# Patient Record
Sex: MALE | Race: WHITE | NOT HISPANIC OR LATINO | Employment: OTHER | URBAN - METROPOLITAN AREA
[De-identification: names, ages, dates, MRNs, and addresses within clinical notes are randomized per-mention and may not be internally consistent; named-entity substitution may affect disease eponyms.]

---

## 2021-05-04 ENCOUNTER — TELEPHONE (OUTPATIENT)
Dept: SLEEP MEDICINE | Facility: MEDICAL CENTER | Age: 66
End: 2021-05-04

## 2021-05-04 NOTE — TELEPHONE ENCOUNTER
EMAIL RECEIVED FROM ANGIE.    PT INTERESTED IN PURSUING [facility] [employer].    CALLED PATIENT AND ADVISED HIM TO HAVE A REFERRAL SENT TO OUR CLINIC TO GET ESTABLISHED.

## 2021-07-28 ENCOUNTER — SLEEP CENTER VISIT (OUTPATIENT)
Dept: SLEEP MEDICINE | Facility: MEDICAL CENTER | Age: 66
End: 2021-07-28
Payer: MEDICARE

## 2021-07-28 VITALS
SYSTOLIC BLOOD PRESSURE: 124 MMHG | OXYGEN SATURATION: 98 % | BODY MASS INDEX: 34.46 KG/M2 | HEIGHT: 73 IN | RESPIRATION RATE: 16 BRPM | DIASTOLIC BLOOD PRESSURE: 82 MMHG | WEIGHT: 260 LBS | HEART RATE: 75 BPM

## 2021-07-28 DIAGNOSIS — G47.33 OBSTRUCTIVE SLEEP APNEA (ADULT) (PEDIATRIC): ICD-10-CM

## 2021-07-28 PROCEDURE — 99203 OFFICE O/P NEW LOW 30 MIN: CPT | Performed by: FAMILY MEDICINE

## 2021-07-28 NOTE — PROGRESS NOTES
[facility]  Consult Note     Date: 7/28/2021 / Time: 11:03 AM    Patient ID:   Name:             Vince Chavez     YOB: 1955  Age:                 65 y.o.  male   MRN:               [id number]      Thank you for requesting a sleep medicine consultation on Vince Chavez at the sleep center. He presents today with the chief complaints of WESTON and to establish as a new pt. Pt was diagnosed with WESTON on 8/15/2018.  Based on the chart review he had severe sleep apnea with AHI of 80.5 times per hour.  He had a repeat PSG on 11/14/2018 which also showed severe sleep apnea with AHI of 29.1/h.  His latest split-night study was on 11/25/2019.  The AHI was 91.8/h.  The best tolerated pressure was BiPAP 13/9 centimeters.  At this pressure the AHI improved to 8.4/h with 26% of the events being central apneas.  He had residual nocturnal hypoxia therefore O2 bleeding was tried as well.  Based on the chart review he is supposed to be on BiPAP 13/9 with O2 at 2 L/min.  he initial presenting symptoms were snoring, witnessed apneas and non restful sleep. As per pt he continues to have afternoon sleepiness.  As per patient he does not feel any benefit from the therapy and has visual snoring as well as witnessed apnea. He is referred by Dr. Valdez for evaluation and treatment of sleep disorder breathing.     HISTORY OF PRESENT ILLNESS:       At night,  Vince Chavez goes to bed around 9-10 pm on weekdays and  on the weekends. He gets out of bed at 7-8 am on weekdays and on the weekends.  He  averages about 7 hrs of sleep on a good night. He falls asleep within 20 minutes. He wakes up about 1-2 times during the night due to bathroom use and on average It takes him few mins to 15 to fall back asleep.      He is not aware of snoring/breathing pauses/gasping or choking in sleep since he has been on the CPAP. However he occasionally breakthrough snoring and gasping. He  denies any symptoms of restless legs  "syndrome such as an \"urge to move\"  He  legs in the evening or bedtime. He  denies any symptoms of narcolepsy such as sleep paralysis or cataplexy, or any symptoms to suggest parasomnias such as sleep walking or acting out of dreams. He  has not used any medications for the sleep problem.Overall, he does finds his sleep refreshing. In terms of excessive daytime sleepiness,  he complains of sleepiness while reading, watching TV and occasionally while driving.He does take regular naps. The naps are usually 45 min long.He drinks about 2-16 oz caffeinated beverages per day.    We received his 30-day compliance download after his visit.  He is currently on auto BiPAP he PAP min 8 cm IPAP max 18 pressure support 0/4.  His current AHI is 51.1/h and mostly being hypopneas.    REVIEW OF SYSTEMS:       Constitutional: Denies fevers, Denies weight changes  Eyes: Denies changes in vision, no eye pain  Ears/Nose/Throat/Mouth: Denies nasal congestion or sore throat   Cardiovascular: Denies chest pain or palpitations   Respiratory: Denies shortness of breath , Denies cough  Gastrointestinal/Hepatic: Denies abdominal pain, nausea,   Musculoskeletal/Rheum: Denies  joint pain and swelling   Skin/Breast: Denies rash  Neurological: Denies headache, confusion, memory loss or focal weakness/parasthesias  Psychiatric: denies mood disorder     Comprehensive review of systems form is reviewed with the patient and is attached in the EMR.     PMH:  has a past medical history of Abnormal EKG (9/11/07), BPH (benign prostatic hyperplasia), Chickenpox, Colon polyp (2007, 2014), ED (erectile dysfunction), Elevated blood pressure, [place] measles, endoscopy (11/30/07), Obesity, and Sleep apnea.  MEDS:   Current Outpatient Medications:   •  lisinopril-hydrochlorothiazide (PRINZIDE) 10-12.5 MG per tablet, Take 1 tablet by mouth every day., Disp: 90 tablet, Rfl: 2  •  tamsulosin (FLOMAX) 0.4 MG capsule, Take 1 capsule by mouth 1/2 hour after " "breakfast., Disp: 90 capsule, Rfl: 2  •  sildenafil (REVATIO) 20 MG tablet, TAKE 3 TABLETS (60 MG) ORALLY DAILY AS NEEDED 1 HOUR BEFORE SEX (MAX 3/DAY), Disp: , Rfl: 6  ALLERGIES: No Known Allergies  SURGHX:   Past Surgical History:   Procedure Laterality Date   • TONSILLECTOMY       SOCHX:  reports that he has quit smoking. He has a 6.00 pack-year smoking history. He has never used smokeless tobacco. He reports current alcohol use of about 4.8 oz of alcohol per week. He reports that he does not use drugs.FH: No family history on file.    Physical Exam:  Vitals/ General Appearance:   Weight/BMI: Body mass index is 34.3 kg/m².  /82 (BP Location: Right arm, Patient Position: Sitting, BP Cuff Size: Adult)   Pulse 75   Resp 16   Ht 1.854 m (6' 1\")   Wt 118 kg (260 lb)   SpO2 98%   Vitals:    07/28/21 1102   BP: 124/82   BP Location: Right arm   Patient Position: Sitting   BP Cuff Size: Adult   Pulse: 75   Resp: 16   SpO2: 98%   Weight: 118 kg (260 lb)   Height: 1.854 m (6' 1\")           Constitutional: Alert, no distress, well-groomed.  Skin: No rashes in visible areas.  Eye: Round. Conjunctiva clear, lids normal. No icterus.   ENMT: Lips pink without lesions, good dentition, moist mucous membranes. Phonation normal.  Neck: No masses, no thyromegaly. Moves freely without pain.  CV: Pulse as reported by patient  Respiratory: Unlabored respiratory effort, no cough or audible wheeze  Psych: Alert and oriented x3, normal affect and mood.   INVESTIGATIONS:       ASSESSMENT AND PLAN     1. Sleep Apnea      The pathophysiology of sleep anea and the increased risk of cardiovascular morbidity from untreated sleep apnea is discussed in detail with the patient.He is urged to avoid supine sleep, weight gain and alcoholic beverages since all of these can worsen sleep apnea. He is cautioned against drowsy driving. If He feels sleepy while driving, He must pull over for a break/nap, rather than persist on the road, in the " interest of He own safety and that of others on the road.   Plan   - Continue BiPAP at the current pressure.  Commended to bring in his current PAP machine so we can do the compliance download.  If the AHI is elevated we will try to switch him to auto BiPAP.   -Previous sleep studies were reviewed and discussed with the pt   - compliance was reinforced     2. Regarding treatment of other past medical problems and general health maintenance,  He is urged to follow up with PCP.    ADD: I tried calling the patient to discuss the change in pressure however voicemail was left.  If patient agrees will change the BiPAP setting to auto BiPAP EPAP min 10 IPAP max 20 pressure support 4/4.

## 2021-07-28 NOTE — PATIENT INSTRUCTIONS
"  CPAP and BPAP Information  CPAP and BPAP are methods of helping a person breathe with the use of air pressure. CPAP stands for \"continuous positive airway pressure.\" BPAP stands for \"bi-level positive airway pressure.\" In both methods, air is blown through your nose or mouth and into your air passages to help you breathe well.  CPAP and BPAP use different amounts of pressure to blow air. With CPAP, the amount of pressure stays the same while you breathe in and out. With BPAP, the amount of pressure is increased when you breathe in (inhale) so that you can take larger breaths. Your health care provider will recommend whether CPAP or BPAP would be more helpful for you.  Why are CPAP and BPAP treatments used?  CPAP or BPAP can be helpful if you have:  · Sleep apnea.  · Chronic obstructive pulmonary disease (COPD).  · Heart failure.  · Medical conditions that weaken the muscles of the chest including muscular dystrophy, or neurological diseases such as amyotrophic lateral sclerosis (ALS).  · Other problems that cause breathing to be weak, abnormal, or difficult.  CPAP is most commonly used for obstructive sleep apnea (WESTON) to keep the airways from collapsing when the muscles relax during sleep.  How is CPAP or BPAP administered?  Both CPAP and BPAP are provided by a small machine with a flexible plastic tube that attaches to a plastic mask. You wear the mask. Air is blown through the mask into your nose or mouth. The amount of pressure that is used to blow the air can be adjusted on the machine. Your health care provider will determine the pressure setting that should be used based on your individual needs.  When should CPAP or BPAP be used?  In most cases, the mask only needs to be worn during sleep. Generally, the mask needs to be worn throughout the night and during any daytime naps. People with certain medical conditions may also need to wear the mask at other times when they are awake. Follow instructions from " your health care provider about when to use the machine.  What are some tips for using the mask?    · Because the mask needs to be snug, some people feel trapped or closed-in (claustrophobic) when first using the mask. If you feel this way, you may need to get used to the mask. One way to do this is by holding the mask loosely over your nose or mouth and then gradually applying the mask more snugly. You can also gradually increase the amount of time that you use the mask.  · Masks are available in various types and sizes. Some fit over your mouth and nose while others fit over just your nose. If your mask does not fit well, talk with your health care provider about getting a different one.  · If you are using a mask that fits over your nose and you tend to breathe through your mouth, a chin strap may be applied to help keep your mouth closed.  · The CPAP and BPAP machines have alarms that may sound if the mask comes off or develops a leak.  · If you have trouble with the mask, it is very important that you talk with your health care provider about finding a way to make the mask easier to tolerate. Do not stop using the mask. Stopping the use of the mask could have a negative impact on your health.  What are some tips for using the machine?  · Place your CPAP or BPAP machine on a secure table or stand near an electrical outlet.  · Know where the on/off switch is located on the machine.  · Follow instructions from your health care provider about how to set the pressure on your machine and when you should use it.  · Do not eat or drink while the CPAP or BPAP machine is on. Food or fluids could get pushed into your lungs by the pressure of the CPAP or BPAP.  · Do not smoke. Tobacco smoke residue can damage the machine.  · For home use, CPAP and BPAP machines can be rented or purchased through home health care companies. Many different brands of machines are available. Renting a machine before purchasing may help you  find out which particular machine works well for you.  · Keep the CPAP or BPAP machine and attachments clean. Ask your health care provider for specific instructions.  Get help right away if:  · You have redness or open areas around your nose or mouth where the mask fits.  · You have trouble using the CPAP or BPAP machine.  · You cannot tolerate wearing the CPAP or BPAP mask.  · You have pain, discomfort, and bloating in your abdomen.  Summary  · CPAP and BPAP are methods of helping a person breathe with the use of air pressure.  · Both CPAP and BPAP are provided by a small machine with a flexible plastic tube that attaches to a plastic mask.  · If you have trouble with the mask, it is very important that you talk with your health care provider about finding a way to make the mask easier to tolerate.  This information is not intended to replace advice given to you by your health care provider. Make sure you discuss any questions you have with your health care provider.  Document Released: 09/15/2005 Document Revised: 04/08/2020 Document Reviewed: 11/06/2017  Elsevier Patient Education © 2020 [employer]

## 2021-09-03 ENCOUNTER — SLEEP CENTER VISIT (OUTPATIENT)
Dept: SLEEP MEDICINE | Facility: MEDICAL CENTER | Age: 66
End: 2021-09-03
Payer: MEDICARE

## 2021-09-03 VITALS
HEART RATE: 72 BPM | RESPIRATION RATE: 16 BRPM | HEIGHT: 73 IN | WEIGHT: 264 LBS | BODY MASS INDEX: 34.99 KG/M2 | SYSTOLIC BLOOD PRESSURE: 134 MMHG | OXYGEN SATURATION: 94 % | DIASTOLIC BLOOD PRESSURE: 88 MMHG

## 2021-09-03 DIAGNOSIS — G47.34 SLEEP RELATED HYPOXIA: ICD-10-CM

## 2021-09-03 DIAGNOSIS — G47.33 OSA (OBSTRUCTIVE SLEEP APNEA): ICD-10-CM

## 2021-09-03 PROCEDURE — 99213 OFFICE O/P EST LOW 20 MIN: CPT | Performed by: FAMILY MEDICINE

## 2021-09-03 NOTE — PROGRESS NOTES
[facility] Follow Up Note     Date: 9/3/2021 / Time: 11:05 AM    Patient ID:   Name:             Vince Chavez     YOB: 1955  Age:                 65 y.o.  male   MRN:               [id number]      Thank you for requesting a sleep medicine consultation on Vince Chavez at the sleep center. He presents today with the chief complaints of WESTON follow up.     HISTORY OF PRESENT ILLNESS:       Pt is currently on auto BiPAP EPAP min 10 IPAP max 20 pressure support 4/4. He goes to sleep 9-10 pm on weekdays and  on the weekends. He gets out of bed at 7-8 am on weekdays and on the weekends.  He  averages about 7 hrs of sleep on a good night. He denies any symptoms of RLS, narcolepsy or any symptoms to suggest parasomnias such as nightmares, sleep walking or acting out of dreams.      He is using BiPAP most days of the week. Pt reports 2-4 hrs of average nightly use of BiPAP. Pt denies snoring, gasping,choking.Pt also denies significant mask leak that is interfering with sleep. The 30 day compliance was downloaded which shows inadequate compliance with more that 4 hr usage about 13%. The AHI is has improved to 30.7/hr. The mask leak is abnormal. The symptoms of excessive daytime has continued.     SLEEP HISTORY   was diagnosed with WESTON on 8/15/2018.  Based on the chart review he had severe sleep apnea with AHI of 80.5 times per hour.  He had a repeat PSG on 11/14/2018 which also showed severe sleep apnea with AHI of 29.1/h.  His latest split-night study was on 11/25/2019.  The AHI was 91.8/h.  The best tolerated pressure was BiPAP 13/9 centimeters.  At this pressure the AHI improved to 8.4/h with 26% of the events being central apneas.  He had residual nocturnal hypoxia therefore O2 bleeding was tried as well.  Based on the chart review he is supposed to be on BiPAP 13/9 with O2 at 2 L/min.       REVIEW OF SYSTEMS:       Constitutional: Denies fevers, Denies weight changes  Eyes: Denies  changes in vision, no eye pain  Ears/Nose/Throat/Mouth: Denies nasal congestion or sore throat   Cardiovascular: Denies chest pain or palpitations   Respiratory: Denies shortness of breath , Denies cough  Gastrointestinal/Hepatic: Denies abdominal pain, nausea, vomiting, diarrhea, constipation or GI bleeding   Genitourinary: Deniesdysuria or frequency  Musculoskeletal/Rheum: Denies  joint pain and swelling   Skin/Breast: Denies rash,   Neurological: Denies headache, confusion, memory loss or focal weakness/parasthesias  Psychiatric: denies mood disorder   Sleep: denies snoring     Comprehensive review of systems form is reviewed with the patient and is attached in the EMR.     PMH:  has a past medical history of Abnormal EKG (9/11/07), BPH (benign prostatic hyperplasia), Chickenpox, Colon polyp (2007, 2014), ED (erectile dysfunction), Elevated blood pressure, [place] measles, endoscopy (11/30/07), Obesity, and Sleep apnea.  MEDS:   Current Outpatient Medications:   •  lisinopril-hydrochlorothiazide (PRINZIDE) 10-12.5 MG per tablet, Take 1 tablet by mouth every day., Disp: 90 tablet, Rfl: 2  •  tamsulosin (FLOMAX) 0.4 MG capsule, Take 1 capsule by mouth 1/2 hour after breakfast., Disp: 90 capsule, Rfl: 2  •  sildenafil (REVATIO) 20 MG tablet, TAKE 3 TABLETS (60 MG) ORALLY DAILY AS NEEDED 1 HOUR BEFORE SEX (MAX 3/DAY) (Patient not taking: Reported on 9/3/2021), Disp: , Rfl: 6  ALLERGIES: No Known Allergies  SURGHX:   Past Surgical History:   Procedure Laterality Date   • TONSILLECTOMY       SOCHX:  reports that he has quit smoking. He has a 6.00 pack-year smoking history. He has never used smokeless tobacco. He reports current alcohol use of about 4.8 oz of alcohol per week. He reports that he does not use drugs..  FH: No family history on file.      Physical Exam:  Vitals/ General Appearance:   Weight/BMI: Body mass index is 34.83 kg/m².  /88 (BP Location: Right arm, Patient Position: Sitting, BP Cuff Size:  "Adult)   Pulse 72   Resp 16   Ht 1.854 m (6' 1\")   Wt 120 kg (264 lb)   SpO2 94%   Vitals:    09/03/21 1049   BP: 134/88   BP Location: Right arm   Patient Position: Sitting   BP Cuff Size: Adult   Pulse: 72   Resp: 16   SpO2: 94%   Weight: 120 kg (264 lb)   Height: 1.854 m (6' 1\")       Pt. is alert and oriented to time, place and person. Cooperative and in no apparent distress.       Constitutional: Alert, no distress, well-groomed.  Skin: No rashes in visible areas.  Eye: Round. Conjunctiva clear, lids normal. No icterus.   ENMT: Lips pink without lesions, good dentition, moist mucous membranes. Phonation normal.  Neck: No masses, no thyromegaly. Moves freely without pain.  CV: Pulse as reported by patient  Respiratory: Unlabored respiratory effort, no cough or audible wheeze  Psych: Alert and oriented x3, normal affect and mood.     ASSESSMENT AND PLAN     1. Sleep Apnea    The pathophysiology of sleep anea and the increased risk of cardiovascular morbidity from untreated sleep apnea is discussed in detail with the patient.    He is urged to avoid supine sleep, weight gain and alcoholic beverages since all of these can worsen sleep apnea. He is cautioned against drowsy driving. If He feels sleepy while driving, He must pull over for a break/nap, rather than persist on the road, in the interest of He own safety and that of others on the road.   Plan   - Continue auto BiPAP EPAP min 10 IPAP max 20 pressure support 4/4 cm   -  After informed discussion BiPAP/ASV titration is ordered today    - compliance download was reviewed and discussed with the pt   - compliance was reinforced     2. Regarding treatment of other past medical problems and general health maintenance,  He is urged to follow up with PCP.    "

## 2021-10-08 ENCOUNTER — SLEEP STUDY (OUTPATIENT)
Dept: SLEEP MEDICINE | Facility: MEDICAL CENTER | Age: 66
End: 2021-10-08
Attending: FAMILY MEDICINE
Payer: MEDICARE

## 2021-10-08 DIAGNOSIS — G47.33 OSA (OBSTRUCTIVE SLEEP APNEA): ICD-10-CM

## 2021-10-08 DIAGNOSIS — G47.34 SLEEP RELATED HYPOXIA: ICD-10-CM

## 2021-10-08 PROCEDURE — 95811 POLYSOM 6/>YRS CPAP 4/> PARM: CPT | Performed by: FAMILY MEDICINE

## 2021-10-13 NOTE — PROCEDURES
MONTAGE: Standard    STUDY TYPE: Treatment    RECORDING TECHNIQUE:   After the scalp was prepared, gold plated electrodes were applied to the scalp according to the International 10-20 System. EEG (electroencephalogram) was continuously monitored from the O1-M2, O2-M1, C3-M2, C4-M1, F3-M2, and F4-M1. EOGs (electrooculograms) were monitored by electrodes placed at the left and right outer canthi. Chin EMG (electromyogram) was monitored by electrodes placed on the mentalis and sub-mentalis muscles. Nasal and oral airflow were monitored using a triple port thermocouple as well as oronasal pressure transducer. Respiratory effort was measured by inductive plethysmography technology employing abdominal and thoracic belts. Blood oxygen saturation and pulse were monitored by pulse oximetry. Heart rhythm was monitored by surface electrocardiogram. Leg EMG was studied using surface electrodes placed on left and right anterior tibialis. A microphone was used to monitor tracheal sounds and snoring. Body position was monitored and documented by technician observation.     SCORING CRITERIA:   A modification of the AASM manual for scoring of sleep and associated events was used. Obstructive apneas were scored by cessation of airflow for at least 10 seconds with continuing respiratory effort. Central apneas were scored by cessation of airflow for at least 10 seconds with no respiratory effort. Hypopneas were scored by a 30% or more reduction in airflow for at least 10 seconds accompanied by arterial oxygen desaturation of 3% or an arousal. For CMS (Medicare) patients, per AASM rule 1B, hypopneas are scored by 30% with mild reduction in airflow for at least 10 seconds accompanied by arterial saturation decreased at 4%.    Study start time was 10:20:05 PM. Diagnostic recording time was 8h 11.0m with a total sleep time of 7h 33.5m resulting in a sleep efficiency of 92.36%%. Sleep latency from the start of the study was 03 minutes and  the latency from sleep to REM was 54 minutes. In total,100 arousals were scored for an arousal index of 13.2.    Respiratory:  There were a total of 41 apneas consisting of 4 obstructive apneas, 0 mixed apneas, and 37 central apneas. A total of 39 hypopneas were scored. The apnea index was 5.42 per hour and the hypopnea index was 5.16 per hour resulting in an overall AHI of 10.58. AHI during rem was 2.8 and AHI while supine was 10.60.  49% of the events were central in nature.    Oximetry:  There was a mean oxygen saturation of 94.0% with a minimum oxygen saturation of 78.0%. The patient spent 2.0 minutes of TST with SaO2 <88%.    Cardiac:  The highest heart rate seen while awake was 93 BPM while the highest heart rate during sleep was 69 BPM with an average sleeping heart rate of 53 BPM.    Limb Movements:  There were a total of 29 PLMs during sleep, of which 1 were PLMS arousals. This resulted in a PLMS index of 3.8 and a PLMS arousal index of 0.1.    Titration: BiPAP was tried from 13/9 to 26/21cm H2O      CPAP Titration:  The PAP titration was initiated with BiPAP 13/9 cm of water and the pressure which was slowly titrated up in an attempt to eliminate sleep disordered breathing and snoring. The final pressure tested during the study was BiPAP 26/21 cm water.  The lowest best tolerated pressure was BiPAP 22/18 centimeters.  At this pressure the patient was observed in the supine REM sleep stage. The apnea hypopnea index improved to 3.9 per hour and O2 shah[place] 86%. He spent 3.5 % of sleep time below 88% O2 saturation. Snoring was resolved. The patient utilized medium amaraview mask with heated humidification. The PAP was well-tolerated and there were minimal air leaks. No supplemental oxygen was required.    Impression:  1.  Complex sleep apnea    Recommendations:  I recommend BiPAP 22/18 cm with medium Liane view mask. Recommended 30 day compliance download to assess the efficacy of the recommended pressure and  compliance for further outpatient monitoring and management of CPAP therapy. In some cases alternative treatment options may be proven effective in resolving sleep apnea. These options include upper airway surgery, the use of a dental orthotic, weight loss orpositional therapy. Clinical correlation is required. In general patients with sleep apnea are advised to avoid alcohol, sedatives and not to operate a motor vehicle while drowsy.  Untreated sleep apnea increases the risk for cardiovascular and neurovascular disease.

## 2021-11-10 ENCOUNTER — OFFICE VISIT (OUTPATIENT)
Dept: SLEEP MEDICINE | Facility: MEDICAL CENTER | Age: 66
End: 2021-11-10
Payer: MEDICARE

## 2021-11-10 VITALS
SYSTOLIC BLOOD PRESSURE: 138 MMHG | RESPIRATION RATE: 18 BRPM | BODY MASS INDEX: 35.66 KG/M2 | HEART RATE: 86 BPM | DIASTOLIC BLOOD PRESSURE: 76 MMHG | OXYGEN SATURATION: 95 % | WEIGHT: 269.1 LBS | HEIGHT: 73 IN

## 2021-11-10 DIAGNOSIS — Z23 NEED FOR IMMUNIZATION AGAINST INFLUENZA: ICD-10-CM

## 2021-11-10 DIAGNOSIS — G47.33 OSA (OBSTRUCTIVE SLEEP APNEA): ICD-10-CM

## 2021-11-10 PROCEDURE — 99214 OFFICE O/P EST MOD 30 MIN: CPT | Mod: 25 | Performed by: NURSE PRACTITIONER

## 2021-11-10 PROCEDURE — 90662 IIV NO PRSV INCREASED AG IM: CPT | Performed by: NURSE PRACTITIONER

## 2021-11-10 PROCEDURE — G0008 ADMIN INFLUENZA VIRUS VAC: HCPCS | Performed by: NURSE PRACTITIONER

## 2021-11-10 NOTE — PROGRESS NOTES
Chief Complaint   Patient presents with   • Follow-Up     SS results       HPI:  Vince Chavez is a 66 y.o. year old male here today for follow-up on complex sleep apnea.  Last seen 9/3/2021 by Dr. Nathan.  He initially presented on 7/28/2021 as a new patient referral.  He was diagnosed with WESTON in 2018.  His previous AHI was 80.5/h. He had a repeat PSG on 11/14/2018 which also showed severe sleep apnea with AHI of 29.1/h.  His latest split-night study was on 11/25/2019.  The AHI was 91.8/h. He was previously on auto BiPAP with pressures ranging from 20 to 10 cm/H2O with pressure support of 4.  Previous visit patient was not tolerating BiPAP use and not using it adequately.  They ordered a titration study as a result.    He had a titration study completed on 10/8/2021.  The PAP titration was initiated with BiPAP 13/9 cm of water and the pressure which was slowly titrated up in an attempt to eliminate sleep disordered breathing and snoring. The final pressure tested during the study was BiPAP 26/21 cm water.  The lowest best tolerated pressure was BiPAP 22/18 centimeters.  At this pressure the patient was observed in the supine REM sleep stage. The apnea hypopnea index improved to 3.9 per hour and O2 shahida 86%. He spent 3.5 % of sleep time below 88% O2 saturation. Snoring was resolved. The patient utilized medium amaraview mask with heated humidification.        ROS: As per HPI and otherwise negative if not stated.    Past Medical History:   Diagnosis Date   • Abnormal EKG 9/11/07    non-spec. st abn.   • BPH (benign prostatic hyperplasia)    • Chickenpox    • Colon polyp 2007, 2014   • ED (erectile dysfunction)    • Elevated blood pressure    • [place] measles    • Hx of endoscopy 11/30/07   • Hypertension    • Obesity    • Sleep apnea     failed cpap       Past Surgical History:   Procedure Laterality Date   • LUMBAR INTERLAMINAR EPIDURAL STEROID INJECTION Bilateral 11/2/2021    Procedure: LUMBAR EPIDURAL STEROID  "INJECTION, LEFT AND RIGHT L5-S1 INTERLAMINAR UNDER FLUOROSCOPY NO SEDATION;  Surgeon: Garcia Abbasi D.O.;  Location: [facility];  Service: Orthopedics   • TONSILLECTOMY         History reviewed. No pertinent family history.    Allergies as of 11/10/2021   • (No Known Allergies)        Vitals:  /76 (BP Location: Left arm, Patient Position: Sitting, BP Cuff Size: Large adult)   Pulse 86   Resp 18   Ht 1.854 m (6' 1\")   Wt 122 kg (269 lb 1.6 oz)   SpO2 95%     Current medications as of today   Current Outpatient Medications   Medication Sig Dispense Refill   • lisinopril-hydrochlorothiazide (PRINZIDE) 10-12.5 MG per tablet Take 1 tablet by mouth every day. 90 tablet 2   • tamsulosin (FLOMAX) 0.4 MG capsule Take 1 capsule by mouth 1/2 hour after breakfast. 90 capsule 2     No current facility-administered medications for this visit.         Physical Exam:   Gen:           Alert and oriented, No apparent distress. Mood and affect appropriate, normal interaction with examiner.  Eyes:          PERRL, EOM intact, sclere white, conjunctive moist.  Ears:          Not examined.   Hearing:     Grossly intact.  Nose:          Normal, no lesions or deformities.  Dentition:    Good dentition.  Oropharynx:   Tongue normal, posterior pharynx without erythema or exudate.  Neck:        Supple, trachea midline, no masses.  Respiratory Effort: No intercostal retractions or use of accessory muscles.   Lung Auscultation:      Clear to auscultation bilaterally; no rales, rhonchi or wheezing.  CV:            Regular rate and rhythm. No murmurs, rubs or gallops.  Abd:           Not examined.   Lymphadenopathy: Not examined.  Gait and Station: Normal.  Digits and Nails: No clubbing, cyanosis, petechiae, or nodes.   Cranial Nerves: II-XII grossly intact.  Skin:        No rashes, lesions or ulcers noted.               Ext:           No cyanosis or edema.      Assessment:  1. Need for immunization against influenza  INFLUENZA " VACCINE, HIGH DOSE (65+ ONLY)   2. WESTON (obstructive sleep apnea)  DME Mask Fitting    DME Other       Immunizations:    Flu: 11/10/2021  Pneumovax 23: 10/2/2020  Prevnar 13: 9/23/2019  COVID-19: 3/18/2021, 4/15/2021    Plan:  Updated settings on BiPAP to BiPAP of 22/18 centimeters/H2O.  Also due to complaints of excessive mask leak, we are ordering a mask fitting through [facility] in [place].  Please follow-up 3 months after pressure setting for compliance recheck.    Please note that this dictation was created using voice recognition software. I have made every reasonable attempt to correct obvious errors, but it is possible there are errors of grammar and possibly content that I did not discover before finalizing the note.

## 2021-11-10 NOTE — PATIENT INSTRUCTIONS
Updated settings on BiPAP to BiPAP of 22/18 centimeters/H2O.  Also due to complaints of excessive mask leak, we are ordering a mask fitting through [facility] in [place].  Please follow-up 3 months after pressure setting for compliance recheck.

## 2022-02-14 ENCOUNTER — OFFICE VISIT (OUTPATIENT)
Dept: SLEEP MEDICINE | Facility: MEDICAL CENTER | Age: 67
End: 2022-02-14
Payer: MEDICARE

## 2022-02-14 VITALS
WEIGHT: 270.9 LBS | HEIGHT: 73 IN | OXYGEN SATURATION: 96 % | RESPIRATION RATE: 17 BRPM | SYSTOLIC BLOOD PRESSURE: 148 MMHG | HEART RATE: 80 BPM | DIASTOLIC BLOOD PRESSURE: 92 MMHG | BODY MASS INDEX: 35.9 KG/M2

## 2022-02-14 DIAGNOSIS — G47.33 OSA (OBSTRUCTIVE SLEEP APNEA): ICD-10-CM

## 2022-02-14 PROCEDURE — 99213 OFFICE O/P EST LOW 20 MIN: CPT | Performed by: NURSE PRACTITIONER

## 2022-02-14 NOTE — PROGRESS NOTES
Chief Complaint   Patient presents with   • Follow-Up     WESTON       HPI:  Vince Chavez is a 66 y.o. year old male here today for follow-up on WESTON.  Last seen by me on  11/10/2021.  Is followed by us for complex sleep apnea. He was diagnosed with WESTON in 2018.  His previous AHI was 80.5/h. He had a repeat PSG on 11/14/2018 which also showed severe sleep apnea with AHI of 29.1/h.  His latest split-night study was on 11/25/2019.  The AHI was 91.8/h. He was previously on auto BiPAP with pressures ranging from 20 to 10 cm/H2O with pressure support of 4.  Previous visit patient was not tolerating BiPAP use and not using it adequately.  They ordered a titration study as a result.     He had a titration study completed on 10/8/2021.  The PAP titration was initiated with BiPAP 13/9 cm of water and the pressure which was slowly titrated up in an attempt to eliminate sleep disordered breathing and snoring. The final pressure tested during the study was BiPAP 26/21 cm water.  The lowest best tolerated pressure was BiPAP 22/18 centimeters.  At this pressure the patient was observed in the supine REM sleep stage. The apnea hypopnea index improved to 3.9 per hour and O2 shahida 86%. He spent 3.5 % of sleep time below 88% O2 saturation. Snoring was resolved. The patient utilized medium amaraview mask with heated humidification.    Based on this titration study, we updated the BiPAP pressures to 22/18 centimeters/H2O.  I also ordered a mask fit through [facility] in [place].  Today is his first follow-up for compliance recheck.    Since last visit, patient has received his replacement BiPAP machine through the recall of [employer].  He has also gotten fitted for a different type of masks which he is finding better fit with no leakage problems except for when the mask is close to its time to be replaced.  He feels that leakages are happening more frequently during this time period.  Overall he feels that his sleep quality is  "improved since adjusting pressures, where he finds he sleeps longer and his sleep quality is better.  He denies any excessive daytime sleepiness, morning headaches, palpitations, concentration or memory problems.    Compliance report was reviewed and does show 100% use with an average time of 6 hours and 24 minutes and a resultant AHI of 16.2.  He apparently is using his old ST card with his new BiPAP, and his BiPAP machine has not been set for proper pressures.  Current machine pressures are auto BiPAP with a max IPAP of 25 cm/H2O minimum EPAP of 4 cm/H2O with a pressure support of 2 to 8 cm/H2O.      ROS: As per HPI and otherwise negative if not stated.    Past Medical History:   Diagnosis Date   • Abnormal EKG 9/11/07    non-spec. st abn.   • BPH (benign prostatic hyperplasia)    • Chickenpox    • Colon polyp 2007, 2014   • ED (erectile dysfunction)    • Elevated blood pressure    • Citizen of [place] measles    • Hx of endoscopy 11/30/07   • Hypertension    • Obesity    • Sleep apnea     failed cpap       Past Surgical History:   Procedure Laterality Date   • LUMBAR INTERLAMINAR EPIDURAL STEROID INJECTION Bilateral 11/2/2021    Procedure: LUMBAR EPIDURAL STEROID INJECTION, LEFT AND RIGHT L5-S1 INTERLAMINAR UNDER FLUOROSCOPY NO SEDATION;  Surgeon: Garcia Abbasi D.O.;  Location: [facility];  Service: Orthopedics   • TONSILLECTOMY         History reviewed. No pertinent family history.    Allergies as of 02/14/2022   • (No Known Allergies)        Vitals:  /92 (BP Location: Left arm, Patient Position: Sitting, BP Cuff Size: Large adult)   Pulse 80   Resp 17   Ht 1.854 m (6' 1\")   Wt 123 kg (270 lb 14.4 oz)   SpO2 96%     Current medications as of today   Current Outpatient Medications   Medication Sig Dispense Refill   • sildenafil (REVATIO) 20 MG tablet TAKE 3 TABLETS (60 MG) BY MOUTH ONCE DAILY AS NEEDED 1 HOUR BEFORE SEX (MAX 3 TABLETS/DAY)     • lisinopril-hydrochlorothiazide (PRINZIDE) 10-12.5 MG per " tablet Take 1 tablet by mouth every day. 90 tablet 2   • tamsulosin (FLOMAX) 0.4 MG capsule Take 1 capsule by mouth 1/2 hour after breakfast. 90 capsule 2     No current facility-administered medications for this visit.         Physical Exam:   Gen:           Alert and oriented, No apparent distress. Mood and affect appropriate, normal interaction with examiner.  Eyes:          PERRL, EOM intact, sclere white, conjunctive moist.  Ears:          Not examined.   Hearing:     Grossly intact.  Nose:          Normal, no lesions or deformities.  Dentition:    Good dentition.  Oropharynx:   Tongue normal, posterior pharynx without erythema or exudate.  Neck:        Supple, trachea midline, no masses.  Respiratory Effort: No intercostal retractions or use of accessory muscles.   Lung Auscultation:      Clear to auscultation bilaterally; no rales, rhonchi or wheezing.  CV:            Regular rate and rhythm. No murmurs, rubs or gallops.  Abd:           Not examined.   Lymphadenopathy: Not examined.  Gait and Station: Normal.  Digits and Nails: No clubbing, cyanosis, petechiae, or nodes.   Cranial Nerves: II-XII grossly intact.  Skin:        No rashes, lesions or ulcers noted.               Ext:           No cyanosis or edema.      Assessment:  1. WESTON (obstructive sleep apnea)           Plan:  1.  Patient will need to bring his replacement BiPAP machine into clinic for pressure adjustment.  He states the soonest he can do this is 2 days from now.  We will adjust pressures then and have him message us in about a month to check compliance.  If all looks okay, we will go to annual visit in July 2022.  Advised patient to reach out with any questions or concerns before next appointment.  In regards to leakage, I recommended a CPAP mask cover.    Please note that this dictation was created using voice recognition software. I have made every reasonable attempt to correct obvious errors, but it is possible there are errors of grammar  and possibly content that I did not discover before finalizing the note.

## 2022-02-14 NOTE — PATIENT INSTRUCTIONS
Patient to bring in machine to verify settings on replacement Cpap (warranty). Compliance reads auto BiPap and SD card reads correct settings of BiPap 22/18 cm/H2O. Follow up in July for annual.  Patient also states his mask wear down and begin to leak before eligible for replacement. For this I recommend Cpap mask liner.

## 2022-02-18 ENCOUNTER — TELEPHONE (OUTPATIENT)
Dept: SLEEP MEDICINE | Facility: MEDICAL CENTER | Age: 67
End: 2022-02-18
Payer: MEDICARE

## 2022-02-18 NOTE — TELEPHONE ENCOUNTER
Pt brought in her replacement machine for it to be adjusted and pressure was change to auto BiPAP with a max IPAP of 25 cm/H2O minimum EPAP of 4 cm/H2O with a pressure support of 2 to 8 cm/H2O.

## 2022-09-09 PROBLEM — I10 ESSENTIAL HYPERTENSION: Status: ACTIVE | Noted: 2022-09-09

## 2022-09-09 PROBLEM — M48.062 SPINAL STENOSIS OF LUMBAR REGION WITH NEUROGENIC CLAUDICATION: Status: ACTIVE | Noted: 2022-09-09

## 2022-09-09 PROBLEM — D48.9 NEOPLASM OF UNCERTAIN BEHAVIOR: Status: ACTIVE | Noted: 2022-09-09

## 2022-09-09 PROBLEM — R73.9 HYPERGLYCEMIA: Status: ACTIVE | Noted: 2022-09-09

## 2022-09-09 PROBLEM — L72.0 EPIDERMAL CYST: Status: ACTIVE | Noted: 2022-09-09

## 2022-09-09 PROBLEM — D23.61 OTHER BENIGN NEOPLASM OF SKIN OF RIGHT UPPER LIMB, INCLUDING SHOULDER: Status: ACTIVE | Noted: 2022-09-09

## 2023-05-01 PROBLEM — M48.062 LUMBAR STENOSIS WITH NEUROGENIC CLAUDICATION: Status: ACTIVE | Noted: 2023-05-01

## 2024-10-08 PROBLEM — N52.9 ERECTILE DYSFUNCTION: Status: ACTIVE | Noted: 2024-10-08

## 2024-10-08 PROBLEM — G89.29 CHRONIC PAIN OF RIGHT KNEE: Status: ACTIVE | Noted: 2024-10-08

## 2024-10-08 PROBLEM — M25.561 CHRONIC PAIN OF RIGHT KNEE: Status: ACTIVE | Noted: 2024-10-08

## 2025-03-16 ENCOUNTER — APPOINTMENT (OUTPATIENT)
Dept: RADIOLOGY | Facility: MEDICAL CENTER | Age: 70
DRG: 418 | End: 2025-03-16
Attending: EMERGENCY MEDICINE
Payer: MEDICARE

## 2025-03-16 ENCOUNTER — HOSPITAL ENCOUNTER (INPATIENT)
Facility: MEDICAL CENTER | Age: 70
LOS: 1 days | DRG: 418 | End: 2025-03-19
Attending: EMERGENCY MEDICINE | Admitting: SURGERY
Payer: MEDICARE

## 2025-03-16 DIAGNOSIS — R78.81 BACTEREMIA: ICD-10-CM

## 2025-03-16 DIAGNOSIS — M25.561 CHRONIC PAIN OF RIGHT KNEE: ICD-10-CM

## 2025-03-16 DIAGNOSIS — G89.18 ACUTE POST-OPERATIVE PAIN: ICD-10-CM

## 2025-03-16 DIAGNOSIS — K81.9 CHOLECYSTITIS: ICD-10-CM

## 2025-03-16 DIAGNOSIS — R07.9 CHEST PAIN, UNSPECIFIED TYPE: ICD-10-CM

## 2025-03-16 DIAGNOSIS — G89.29 CHRONIC PAIN OF RIGHT KNEE: ICD-10-CM

## 2025-03-16 LAB
ALBUMIN SERPL BCP-MCNC: 4 G/DL (ref 3.2–4.9)
ALBUMIN/GLOB SERPL: 1.4 G/DL
ALP SERPL-CCNC: 58 U/L (ref 30–99)
ALT SERPL-CCNC: 94 U/L (ref 2–50)
ANION GAP SERPL CALC-SCNC: 12 MMOL/L (ref 7–16)
APPEARANCE UR: CLEAR
AST SERPL-CCNC: 111 U/L (ref 12–45)
BACTERIA #/AREA URNS HPF: ABNORMAL /HPF
BASOPHILS # BLD AUTO: 0.6 % (ref 0–1.8)
BASOPHILS # BLD: 0.06 K/UL (ref 0–0.12)
BILIRUB SERPL-MCNC: 1 MG/DL (ref 0.1–1.5)
BILIRUB UR QL STRIP.AUTO: NEGATIVE
BUN SERPL-MCNC: 20 MG/DL (ref 8–22)
CALCIUM ALBUM COR SERPL-MCNC: 9.6 MG/DL (ref 8.5–10.5)
CALCIUM SERPL-MCNC: 9.6 MG/DL (ref 8.5–10.5)
CASTS URNS QL MICRO: ABNORMAL /LPF (ref 0–2)
CHLORIDE SERPL-SCNC: 104 MMOL/L (ref 96–112)
CO2 SERPL-SCNC: 25 MMOL/L (ref 20–33)
COLOR UR: YELLOW
CREAT SERPL-MCNC: 1.19 MG/DL (ref 0.5–1.4)
D DIMER PPP IA.FEU-MCNC: 1.6 UG/ML (FEU) (ref 0–0.5)
EKG IMPRESSION: NORMAL
EKG IMPRESSION: NORMAL
EOSINOPHIL # BLD AUTO: 0.22 K/UL (ref 0–0.51)
EOSINOPHIL NFR BLD: 2 % (ref 0–6.9)
EPITHELIAL CELLS 1715: ABNORMAL /HPF (ref 0–5)
ERYTHROCYTE [DISTWIDTH] IN BLOOD BY AUTOMATED COUNT: 46.4 FL (ref 35.9–50)
ETHANOL BLD-MCNC: <10.1 MG/DL
FLUAV RNA SPEC QL NAA+PROBE: NEGATIVE
FLUBV RNA SPEC QL NAA+PROBE: NEGATIVE
GFR SERPLBLD CREATININE-BSD FMLA CKD-EPI: 65 ML/MIN/1.73 M 2
GLOBULIN SER CALC-MCNC: 2.9 G/DL (ref 1.9–3.5)
GLUCOSE SERPL-MCNC: 132 MG/DL (ref 65–99)
GLUCOSE UR STRIP.AUTO-MCNC: NEGATIVE MG/DL
HCT VFR BLD AUTO: 46 % (ref 42–52)
HGB BLD-MCNC: 15.3 G/DL (ref 14–18)
HYALINE CAST   1831: PRESENT /LPF
IMM GRANULOCYTES # BLD AUTO: 0.03 K/UL (ref 0–0.11)
IMM GRANULOCYTES NFR BLD AUTO: 0.3 % (ref 0–0.9)
KETONES UR STRIP.AUTO-MCNC: NEGATIVE MG/DL
LACTATE SERPL-SCNC: 2.5 MMOL/L (ref 0.5–2)
LACTATE SERPL-SCNC: 2.8 MMOL/L (ref 0.5–2)
LEUKOCYTE ESTERASE UR QL STRIP.AUTO: ABNORMAL
LYMPHOCYTES # BLD AUTO: 2.12 K/UL (ref 1–4.8)
LYMPHOCYTES NFR BLD: 19.6 % (ref 22–41)
MCH RBC QN AUTO: 31.5 PG (ref 27–33)
MCHC RBC AUTO-ENTMCNC: 33.3 G/DL (ref 32.3–36.5)
MCV RBC AUTO: 94.7 FL (ref 81.4–97.8)
MICRO URNS: ABNORMAL
MONOCYTES # BLD AUTO: 0.79 K/UL (ref 0–0.85)
MONOCYTES NFR BLD AUTO: 7.3 % (ref 0–13.4)
NEUTROPHILS # BLD AUTO: 7.59 K/UL (ref 1.82–7.42)
NEUTROPHILS NFR BLD: 70.2 % (ref 44–72)
NITRITE UR QL STRIP.AUTO: NEGATIVE
NRBC # BLD AUTO: 0 K/UL
NRBC BLD-RTO: 0 /100 WBC (ref 0–0.2)
NT-PROBNP SERPL IA-MCNC: <36 PG/ML (ref 0–125)
PH UR STRIP.AUTO: 5.5 [PH] (ref 5–8)
PLATELET # BLD AUTO: 240 K/UL (ref 164–446)
PMV BLD AUTO: 9.7 FL (ref 9–12.9)
POTASSIUM SERPL-SCNC: 3.8 MMOL/L (ref 3.6–5.5)
PROT SERPL-MCNC: 6.9 G/DL (ref 6–8.2)
PROT UR QL STRIP: NEGATIVE MG/DL
RBC # BLD AUTO: 4.86 M/UL (ref 4.7–6.1)
RBC # URNS HPF: ABNORMAL /HPF (ref 0–2)
RBC UR QL AUTO: NEGATIVE
RSV RNA SPEC QL NAA+PROBE: NEGATIVE
SARS-COV-2 RNA RESP QL NAA+PROBE: NOTDETECTED
SODIUM SERPL-SCNC: 141 MMOL/L (ref 135–145)
SP GR UR STRIP.AUTO: 1.02
TROPONIN T SERPL-MCNC: 23 NG/L (ref 6–19)
TROPONIN T SERPL-MCNC: 24 NG/L (ref 6–19)
TROPONIN T SERPL-MCNC: 26 NG/L (ref 6–19)
UROBILINOGEN UR STRIP.AUTO-MCNC: 1 EU/DL
WBC # BLD AUTO: 10.8 K/UL (ref 4.8–10.8)
WBC #/AREA URNS HPF: ABNORMAL /HPF

## 2025-03-16 PROCEDURE — A9270 NON-COVERED ITEM OR SERVICE: HCPCS | Performed by: EMERGENCY MEDICINE

## 2025-03-16 PROCEDURE — 0241U HCHG SARS-COV-2 COVID-19 NFCT DS RESP RNA 4 TRGT ED POC: CPT

## 2025-03-16 PROCEDURE — G0378 HOSPITAL OBSERVATION PER HR: HCPCS

## 2025-03-16 PROCEDURE — 93005 ELECTROCARDIOGRAM TRACING: CPT | Mod: TC | Performed by: EMERGENCY MEDICINE

## 2025-03-16 PROCEDURE — 96365 THER/PROPH/DIAG IV INF INIT: CPT

## 2025-03-16 PROCEDURE — 99285 EMERGENCY DEPT VISIT HI MDM: CPT

## 2025-03-16 PROCEDURE — 87186 SC STD MICRODIL/AGAR DIL: CPT

## 2025-03-16 PROCEDURE — 87015 SPECIMEN INFECT AGNT CONCNTJ: CPT

## 2025-03-16 PROCEDURE — 85025 COMPLETE CBC W/AUTO DIFF WBC: CPT

## 2025-03-16 PROCEDURE — 700102 HCHG RX REV CODE 250 W/ 637 OVERRIDE(OP): Performed by: EMERGENCY MEDICINE

## 2025-03-16 PROCEDURE — 80053 COMPREHEN METABOLIC PANEL: CPT

## 2025-03-16 PROCEDURE — 87040 BLOOD CULTURE FOR BACTERIA: CPT

## 2025-03-16 PROCEDURE — 81001 URINALYSIS AUTO W/SCOPE: CPT

## 2025-03-16 PROCEDURE — 83605 ASSAY OF LACTIC ACID: CPT

## 2025-03-16 PROCEDURE — 82077 ASSAY SPEC XCP UR&BREATH IA: CPT

## 2025-03-16 PROCEDURE — 700117 HCHG RX CONTRAST REV CODE 255: Performed by: EMERGENCY MEDICINE

## 2025-03-16 PROCEDURE — 96375 TX/PRO/DX INJ NEW DRUG ADDON: CPT

## 2025-03-16 PROCEDURE — 71045 X-RAY EXAM CHEST 1 VIEW: CPT

## 2025-03-16 PROCEDURE — 85379 FIBRIN DEGRADATION QUANT: CPT

## 2025-03-16 PROCEDURE — 93005 ELECTROCARDIOGRAM TRACING: CPT | Mod: TC

## 2025-03-16 PROCEDURE — 700111 HCHG RX REV CODE 636 W/ 250 OVERRIDE (IP): Mod: JZ | Performed by: EMERGENCY MEDICINE

## 2025-03-16 PROCEDURE — 71275 CT ANGIOGRAPHY CHEST: CPT

## 2025-03-16 PROCEDURE — 96366 THER/PROPH/DIAG IV INF ADDON: CPT

## 2025-03-16 PROCEDURE — 700105 HCHG RX REV CODE 258: Performed by: EMERGENCY MEDICINE

## 2025-03-16 PROCEDURE — 76705 ECHO EXAM OF ABDOMEN: CPT

## 2025-03-16 PROCEDURE — 87086 URINE CULTURE/COLONY COUNT: CPT

## 2025-03-16 PROCEDURE — 36415 COLL VENOUS BLD VENIPUNCTURE: CPT

## 2025-03-16 PROCEDURE — 83880 ASSAY OF NATRIURETIC PEPTIDE: CPT

## 2025-03-16 PROCEDURE — 87077 CULTURE AEROBIC IDENTIFY: CPT | Mod: 91

## 2025-03-16 PROCEDURE — 84484 ASSAY OF TROPONIN QUANT: CPT | Mod: 91

## 2025-03-16 RX ORDER — IBUPROFEN 600 MG/1
600 TABLET, FILM COATED ORAL ONCE
Status: COMPLETED | OUTPATIENT
Start: 2025-03-16 | End: 2025-03-16

## 2025-03-16 RX ORDER — OXYCODONE HYDROCHLORIDE 10 MG/1
10 TABLET ORAL
Refills: 0 | Status: DISCONTINUED | OUTPATIENT
Start: 2025-03-16 | End: 2025-03-19 | Stop reason: HOSPADM

## 2025-03-16 RX ORDER — ENOXAPARIN SODIUM 100 MG/ML
40 INJECTION SUBCUTANEOUS DAILY
Status: DISCONTINUED | OUTPATIENT
Start: 2025-03-17 | End: 2025-03-19 | Stop reason: HOSPADM

## 2025-03-16 RX ORDER — METRONIDAZOLE 500 MG/100ML
500 INJECTION, SOLUTION INTRAVENOUS ONCE
Status: COMPLETED | OUTPATIENT
Start: 2025-03-16 | End: 2025-03-16

## 2025-03-16 RX ORDER — CEFTRIAXONE 2 G/1
2000 INJECTION, POWDER, FOR SOLUTION INTRAMUSCULAR; INTRAVENOUS ONCE
Status: COMPLETED | OUTPATIENT
Start: 2025-03-16 | End: 2025-03-16

## 2025-03-16 RX ORDER — AMOXICILLIN 250 MG
1 CAPSULE ORAL
Status: DISCONTINUED | OUTPATIENT
Start: 2025-03-16 | End: 2025-03-19 | Stop reason: HOSPADM

## 2025-03-16 RX ORDER — NITROGLYCERIN 0.4 MG/1
0.4 TABLET SUBLINGUAL
Status: DISCONTINUED | OUTPATIENT
Start: 2025-03-16 | End: 2025-03-16

## 2025-03-16 RX ORDER — CELECOXIB 200 MG/1
200 CAPSULE ORAL 2 TIMES DAILY PRN
Status: DISCONTINUED | OUTPATIENT
Start: 2025-03-21 | End: 2025-03-19 | Stop reason: HOSPADM

## 2025-03-16 RX ORDER — HYDROMORPHONE HYDROCHLORIDE 1 MG/ML
0.5 INJECTION, SOLUTION INTRAMUSCULAR; INTRAVENOUS; SUBCUTANEOUS
Status: DISCONTINUED | OUTPATIENT
Start: 2025-03-16 | End: 2025-03-19 | Stop reason: HOSPADM

## 2025-03-16 RX ORDER — ACETAMINOPHEN 500 MG
1000 TABLET ORAL EVERY 6 HOURS
Status: DISCONTINUED | OUTPATIENT
Start: 2025-03-17 | End: 2025-03-19 | Stop reason: HOSPADM

## 2025-03-16 RX ORDER — ONDANSETRON 2 MG/ML
4 INJECTION INTRAMUSCULAR; INTRAVENOUS EVERY 4 HOURS PRN
Status: DISCONTINUED | OUTPATIENT
Start: 2025-03-16 | End: 2025-03-19 | Stop reason: HOSPADM

## 2025-03-16 RX ORDER — AMOXICILLIN 250 MG
1 CAPSULE ORAL NIGHTLY
Status: DISCONTINUED | OUTPATIENT
Start: 2025-03-16 | End: 2025-03-19 | Stop reason: HOSPADM

## 2025-03-16 RX ORDER — SODIUM PHOSPHATE,MONO-DIBASIC 19G-7G/118
1 ENEMA (ML) RECTAL
Status: DISCONTINUED | OUTPATIENT
Start: 2025-03-16 | End: 2025-03-19 | Stop reason: HOSPADM

## 2025-03-16 RX ORDER — CELECOXIB 200 MG/1
200 CAPSULE ORAL 2 TIMES DAILY
Status: DISCONTINUED | OUTPATIENT
Start: 2025-03-16 | End: 2025-03-19 | Stop reason: HOSPADM

## 2025-03-16 RX ORDER — METRONIDAZOLE 500 MG/100ML
500 INJECTION, SOLUTION INTRAVENOUS EVERY 12 HOURS
Status: COMPLETED | OUTPATIENT
Start: 2025-03-16 | End: 2025-03-18

## 2025-03-16 RX ORDER — OXYCODONE HYDROCHLORIDE 5 MG/1
5 TABLET ORAL
Refills: 0 | Status: DISCONTINUED | OUTPATIENT
Start: 2025-03-16 | End: 2025-03-19 | Stop reason: HOSPADM

## 2025-03-16 RX ORDER — ACETAMINOPHEN 325 MG/1
650 TABLET ORAL ONCE
Status: COMPLETED | OUTPATIENT
Start: 2025-03-16 | End: 2025-03-16

## 2025-03-16 RX ORDER — DOCUSATE SODIUM 100 MG/1
100 CAPSULE, LIQUID FILLED ORAL 2 TIMES DAILY
Status: DISCONTINUED | OUTPATIENT
Start: 2025-03-16 | End: 2025-03-19 | Stop reason: HOSPADM

## 2025-03-16 RX ORDER — SODIUM CHLORIDE, SODIUM LACTATE, POTASSIUM CHLORIDE, CALCIUM CHLORIDE 600; 310; 30; 20 MG/100ML; MG/100ML; MG/100ML; MG/100ML
1000 INJECTION, SOLUTION INTRAVENOUS ONCE
Status: COMPLETED | OUTPATIENT
Start: 2025-03-16 | End: 2025-03-16

## 2025-03-16 RX ORDER — INSULIN LISPRO 100 [IU]/ML
1-6 INJECTION, SOLUTION INTRAVENOUS; SUBCUTANEOUS EVERY 6 HOURS
Status: DISCONTINUED | OUTPATIENT
Start: 2025-03-17 | End: 2025-03-18

## 2025-03-16 RX ORDER — ASPIRIN 325 MG
325 TABLET ORAL ONCE
Status: DISCONTINUED | OUTPATIENT
Start: 2025-03-16 | End: 2025-03-16

## 2025-03-16 RX ORDER — SODIUM CHLORIDE, SODIUM LACTATE, POTASSIUM CHLORIDE, CALCIUM CHLORIDE 600; 310; 30; 20 MG/100ML; MG/100ML; MG/100ML; MG/100ML
INJECTION, SOLUTION INTRAVENOUS CONTINUOUS
Status: DISCONTINUED | OUTPATIENT
Start: 2025-03-16 | End: 2025-03-19 | Stop reason: HOSPADM

## 2025-03-16 RX ORDER — ONDANSETRON 4 MG/1
4 TABLET, ORALLY DISINTEGRATING ORAL EVERY 4 HOURS PRN
Status: DISCONTINUED | OUTPATIENT
Start: 2025-03-16 | End: 2025-03-19 | Stop reason: HOSPADM

## 2025-03-16 RX ORDER — POLYETHYLENE GLYCOL 3350 17 G/17G
1 POWDER, FOR SOLUTION ORAL 2 TIMES DAILY
Status: DISCONTINUED | OUTPATIENT
Start: 2025-03-16 | End: 2025-03-19 | Stop reason: HOSPADM

## 2025-03-16 RX ORDER — ACETAMINOPHEN 500 MG
1000 TABLET ORAL EVERY 6 HOURS PRN
Status: DISCONTINUED | OUTPATIENT
Start: 2025-03-22 | End: 2025-03-19 | Stop reason: HOSPADM

## 2025-03-16 RX ORDER — FAMOTIDINE 20 MG/1
20 TABLET, FILM COATED ORAL 2 TIMES DAILY
Status: DISCONTINUED | OUTPATIENT
Start: 2025-03-16 | End: 2025-03-19 | Stop reason: HOSPADM

## 2025-03-16 RX ORDER — BISACODYL 10 MG
10 SUPPOSITORY, RECTAL RECTAL
Status: DISCONTINUED | OUTPATIENT
Start: 2025-03-16 | End: 2025-03-19 | Stop reason: HOSPADM

## 2025-03-16 RX ORDER — DEXTROSE MONOHYDRATE 25 G/50ML
25 INJECTION, SOLUTION INTRAVENOUS
Status: DISCONTINUED | OUTPATIENT
Start: 2025-03-16 | End: 2025-03-19 | Stop reason: HOSPADM

## 2025-03-16 RX ADMIN — NITROGLYCERIN 0.4 MG: 0.4 TABLET SUBLINGUAL at 18:34

## 2025-03-16 RX ADMIN — NITROGLYCERIN 0.4 MG: 0.4 TABLET SUBLINGUAL at 18:18

## 2025-03-16 RX ADMIN — SODIUM CHLORIDE, POTASSIUM CHLORIDE, SODIUM LACTATE AND CALCIUM CHLORIDE 1000 ML: 600; 310; 30; 20 INJECTION, SOLUTION INTRAVENOUS at 21:59

## 2025-03-16 RX ADMIN — NITROGLYCERIN 0.4 MG: 0.4 TABLET SUBLINGUAL at 18:25

## 2025-03-16 RX ADMIN — ACETAMINOPHEN 650 MG: 325 TABLET, FILM COATED ORAL at 18:18

## 2025-03-16 RX ADMIN — CEFTRIAXONE SODIUM 2000 MG: 2 INJECTION, POWDER, FOR SOLUTION INTRAMUSCULAR; INTRAVENOUS at 20:49

## 2025-03-16 RX ADMIN — IOHEXOL 100 ML: 350 INJECTION, SOLUTION INTRAVENOUS at 17:45

## 2025-03-16 RX ADMIN — IBUPROFEN 600 MG: 600 TABLET, FILM COATED ORAL at 19:09

## 2025-03-16 RX ADMIN — SODIUM CHLORIDE, POTASSIUM CHLORIDE, SODIUM LACTATE AND CALCIUM CHLORIDE 1000 ML: 600; 310; 30; 20 INJECTION, SOLUTION INTRAVENOUS at 19:09

## 2025-03-16 RX ADMIN — ACETAMINOPHEN 650 MG: 325 TABLET ORAL at 22:02

## 2025-03-16 RX ADMIN — METRONIDAZOLE 500 MG: 500 INJECTION, SOLUTION INTRAVENOUS at 20:54

## 2025-03-16 ASSESSMENT — LIFESTYLE VARIABLES: DO YOU DRINK ALCOHOL: NO

## 2025-03-16 ASSESSMENT — PAIN DESCRIPTION - PAIN TYPE
TYPE: ACUTE PAIN
TYPE: ACUTE PAIN

## 2025-03-16 ASSESSMENT — FIBROSIS 4 INDEX: FIB4 SCORE: 1.2

## 2025-03-16 ASSESSMENT — HEART SCORE
AGE: 65+
HISTORY: SLIGHTLY SUSPICIOUS
RISK FACTORS: 1-2 RISK FACTORS
ECG: NORMAL
HEART SCORE: 4
TROPONIN: 1-3 TIMES NORMAL LIMIT

## 2025-03-16 NOTE — ED NOTES
Pt does report recent air travel. He denies leg swelling or cramping. +chest pain-worse with deep breath. Radiates to back. Started about 1230 today.

## 2025-03-16 NOTE — ED TRIAGE NOTES
"Chief Complaint   Patient presents with    Chest Pain     Pt complaining of chest pain that started about an hour ago. Endorses that it is a sharp pain and about an 8/10.    Shortness of Breath    Dizziness     Chest pain protocol ordered, EKG completed. Pt denies cardiac hx, pt does taking medications for htn.    BP (!) 143/58   Pulse 72   Temp 36.1 °C (97 °F) (Temporal)   Resp 18   Ht 1.854 m (6' 1\")   Wt 118 kg (259 lb 4.2 oz)   SpO2 95%   BMI 34.21 kg/m²     "

## 2025-03-16 NOTE — ED PROVIDER NOTES
"ER Provider Note    Scribed for Sarah Mg M.d. by Cristela Borges. 3/16/2025  2:14 PM    Primary Care Provider: Tomas Valdez M.D.    CHIEF COMPLAINT   Chief Complaint   Patient presents with    Chest Pain     Pt complaining of chest pain that started about an hour ago. Endorses that it is a sharp pain and about an 8/10.    Shortness of Breath    Dizziness     EXTERNAL RECORDS REVIEWED  Outpatient Notes The patient had an annual wellness visit in [facility] of last year, reporting at that time some mild orthostatic dizziness.       HPI/ROS  LIMITATION TO HISTORY   Select: : None  OUTSIDE HISTORIAN(S):  None.    Vince Chavez is a 69 y.o. male who presents to the ED for evaluation of chest pain onset 2 hours ago. He describes that he woke up feeling fine, but about 2 hours ago when he was driving, the patient reports that he suddenly developed sharp mid chest pain.  The sharp chest pain then started to turn into a dull ache.  The patient then reports that he started to feel dizzy; almost like he was going to pass out no efra syncope.. The patient pulled over on the side of the road and then started feeling short of breath. The patient notes that taking a deep breath exacerbates his chest pain. He states that he was slightly clammy at that time.  He mentions that he has history of chronic back pain after a back surgery, and reports that his chronic low back pain is slightly worse right now than it typically is.  However, the chest pain does not distinctly radiate to the back.  The patient states he also has a cough for the last 3 weeks.  He describes it as a \"tickle.\"  No hemoptysis.  No production of phlegm with the cough.  Here in the ER the patient is having shaking chills as ERMD is taking the history. Pt denies any nausea, vomiting, abdominal pain. The patient has history of hypertension, denies any history of patient reports that blood clots, diabetes, high cholesterol. He also denies having " any cardiac history. The patient used to be a smoker, but stopped over 25 years ago. The patient denies any history of a cholecystectomy. Nursing did mention that the patient just got back from a cruise.  Patient says he has not drank any alcohol in the last couple weeks.  Patient says he had just eaten an [employer] roast beef sandwich prior to onset of his pain.    PAST MEDICAL HISTORY  Past Medical History:   Diagnosis Date    Acute pain     BPH (benign prostatic hyperplasia)     [place]    Chickenpox     Colon polyp 2007, 2014    ED (erectile dysfunction)     [place] measles     Hx of endoscopy 11/30/2007    Hyperlipidemia 12/21/2015    Hypertension     Obesity     Personal history of allergy to shellfish     Sleep apnea     Spinal stenosis of lumbar region with neurogenic claudication 09/09/2022       SURGICAL HISTORY  Past Surgical History:   Procedure Laterality Date    PB LUMBAR SPINE FUSION,ANTER APPBC N/A 5/1/2023    Procedure: Retroperitoneal approach and access Lateral interbody fusion L4-5, Additional level lateral interbody fusion L3-4, Additional level lateral interbody fusion L2-3, Posterior segmental instrumentation L2-S1, Bilateral iliac screws S2 Combined posterior lateral interbody fusion L5-S1, Laminectomy L3, Additional level laminectomy L4, Christian Perez osteotomy L2-3, L3-4, L4-5, Autograft, Bone    LUMBAR INTERLAMINAR EPIDURAL STEROID INJECTION Bilateral 3/16/2023    Procedure: LUMBAR EPIDURAL STEROID INJECTION BILATERAL L5-S1  INTERLAMINAR UNDER FLUOROSCOPY;  Surgeon: Garcia Abbasi D.O.;  Location: SURGERY [facility];  Service: Orthopedics    WI INJ DX/THER AGNT PARAVERT FACET JOINT, MAIA* Bilateral 1/12/2023    Procedure: Bilateral L3-L4 and L4-L5 lumbar medial branch nerve blocks under fluoroscopy;  Surgeon: Garcia Abbasi D.O.;  Location: SURGERY [facility];  Service: Orthopedics    LUMBAR INTERLAMINAR EPIDURAL STEROID INJECTION Bilateral 10/20/2022    Procedure: Bilateral L5-S1  lumbar interlaminar epidural steroid injection under fluoroscopy.;  Surgeon: Garcia Abbasi D.O.;  Location: SURGERY [facility];  Service: Orthopedics    LUMBAR INTERLAMINAR EPIDURAL STEROID INJECTION Bilateral 2022    Procedure: Bilateral L5-S1 lumbar interlaminar epidural steroid injection under fluoroscopy.;  Surgeon: Garcia Abbasi D.O.;  Location: SURGERY [facility];  Service: Orthopedics    LUMBAR INTERLAMINAR EPIDURAL STEROID INJECTION Bilateral 2022    Procedure: Bilateral L5-S1 lumbar interlaminar epidural steroid injection under fluoroscopy.;  Surgeon: Garcia Abbasi D.O.;  Location: SURGERY [facility];  Service: Orthopedics    LUMBAR INTERLAMINAR EPIDURAL STEROID INJECTION Bilateral 2021    Procedure: LUMBAR EPIDURAL STEROID INJECTION, LEFT AND RIGHT L5-S1 INTERLAMINAR UNDER FLUOROSCOPY NO SEDATION;  Surgeon: Garcia Abbasi D.O.;  Location: SURGERY [place];  Service: Orthopedics    OTHER      cyst removal from back 2022    TONSILLECTOMY         FAMILY HISTORY  Family History   Problem Relation Age of Onset    Stroke Father             Cancer Sister        SOCIAL HISTORY   reports that he quit smoking about 33 years ago. His smoking use included cigarettes. He started smoking about 53 years ago. He has a 6 pack-year smoking history. He has never used smokeless tobacco. He reports current alcohol use of about 6.0 oz of alcohol per week. He reports that he does not use drugs.      CURRENT MEDICATIONS  Previous Medications    ACETAMINOPHEN (TYLENOL PO)    Take 2 Tablets by mouth one time as needed (pain).    LISINOPRIL-HYDROCHLOROTHIAZIDE (PRINZIDE) 10-12.5 MG PER TABLET    TAKE ONE TABLET BY MOUTH ONE TIME DAILY    MELOXICAM (MOBIC) 7.5 MG TAB    TAKE ONE TABLET BY MOUTH ONE TIME DAILY    SILDENAFIL CITRATE (VIAGRA) 100 MG TABLET    TAKE ONE TABLET BY MOUTH ONE TIME DAILY AS NEEDED FOR ERECTILE DYSFUNCTION    TAMSULOSIN (FLOMAX) 0.4 MG CAPSULE    Take 1 Capsule by mouth every day for 360  "days.       ALLERGIES  Patient has no known allergies.      PHYSICAL EXAM  BP (!) 143/58   Pulse 72   Temp 36.1 °C (97 °F) (Temporal)   Resp 18   Ht 1.854 m (6' 1\")   Wt 118 kg (259 lb 4.2 oz)   SpO2 95%   BMI 34.21 kg/m²     Constitutional: Well developed, well nourished; Mild distress; Non-toxic appearance. Shaking chills.  Skin is slightly warm to touch.  HENT: Normocephalic, atraumatic; Bilateral external ears normal; Oropharynx with slightly dry mucous membranes;   Eyes: PERRL, EOMI, Conjunctiva normal. No discharge.   Neck:  Supple, nontender midline; No stridor; No nuchal rigidity.   Lymphatic: No cervical lymphadenopathy noted.   Cardiovascular: Heart sounds distant but regular without murmurs, rubs, or gallop.   Thorax & Lungs: There is a little inspiratory wheeze in the right lower lung field; otherwise bronchial breath sounds throughout. No respiratory distress, No rales or rhonchi. Nontender chest wall. No crepitus or subcutaneous air  Abdomen: Soft, He is tender in the right upper quadrant and mid epigastrium. Bowel sounds normal. No obvious masses; No pulsatile masses; no rebound, guarding, or peritoneal signs.   Skin: Good color; warm and dry without rash or petechia.  Back: Nontender, No CVA tenderness.   Extremities: Distal radial, dorsalis pedis, posterior tibial pulses are equal bilaterally; No edema; Nontender calves or saphenous, No cyanosis, No clubbing.   Musculoskeletal: Good range of motion in all major joints. No tenderness to palpation or major deformities noted.   Neurologic: Alert & oriented x 4, clear speech         DIAGNOSTIC STUDIES    EKG/LABS  Results for orders placed or performed during the hospital encounter of 03/16/25   CBC with Differential    Collection Time: 03/16/25  1:38 PM   Result Value Ref Range    WBC 10.8 4.8 - 10.8 K/uL    RBC 4.86 4.70 - 6.10 M/uL    Hemoglobin 15.3 14.0 - 18.0 g/dL    Hematocrit 46.0 42.0 - 52.0 %    MCV 94.7 81.4 - 97.8 fL    MCH 31.5 27.0 " - 33.0 pg    MCHC 33.3 32.3 - 36.5 g/dL    RDW 46.4 35.9 - 50.0 fL    Platelet Count 240 164 - 446 K/uL    MPV 9.7 9.0 - 12.9 fL    Neutrophils-Polys 70.20 44.00 - 72.00 %    Lymphocytes 19.60 (L) 22.00 - 41.00 %    Monocytes 7.30 0.00 - 13.40 %    Eosinophils 2.00 0.00 - 6.90 %    Basophils 0.60 0.00 - 1.80 %    Immature Granulocytes 0.30 0.00 - 0.90 %    Nucleated RBC 0.00 0.00 - 0.20 /100 WBC    Neutrophils (Absolute) 7.59 (H) 1.82 - 7.42 K/uL    Lymphs (Absolute) 2.12 1.00 - 4.80 K/uL    Monos (Absolute) 0.79 0.00 - 0.85 K/uL    Eos (Absolute) 0.22 0.00 - 0.51 K/uL    Baso (Absolute) 0.06 0.00 - 0.12 K/uL    Immature Granulocytes (abs) 0.03 0.00 - 0.11 K/uL    NRBC (Absolute) 0.00 K/uL   Complete Metabolic Panel (CMP)    Collection Time: 03/16/25  1:38 PM   Result Value Ref Range    Sodium 141 135 - 145 mmol/L    Potassium 3.8 3.6 - 5.5 mmol/L    Chloride 104 96 - 112 mmol/L    Co2 25 20 - 33 mmol/L    Anion Gap 12.0 7.0 - 16.0    Glucose 132 (H) 65 - 99 mg/dL    Bun 20 8 - 22 mg/dL    Creatinine 1.19 0.50 - 1.40 mg/dL    Calcium 9.6 8.5 - 10.5 mg/dL    Correct Calcium 9.6 8.5 - 10.5 mg/dL    AST(SGOT) 111 (H) 12 - 45 U/L    ALT(SGPT) 94 (H) 2 - 50 U/L    Alkaline Phosphatase 58 30 - 99 U/L    Total Bilirubin 1.0 0.1 - 1.5 mg/dL    Albumin 4.0 3.2 - 4.9 g/dL    Total Protein 6.9 6.0 - 8.2 g/dL    Globulin 2.9 1.9 - 3.5 g/dL    A-G Ratio 1.4 g/dL   proBrain Natriuretic Peptide, NT (BNP)    Collection Time: 03/16/25  1:38 PM   Result Value Ref Range    NT-proBNP <36 0 - 125 pg/mL   Troponins NOW    Collection Time: 03/16/25  1:38 PM   Result Value Ref Range    Troponin T 24 (H) 6 - 19 ng/L   ESTIMATED GFR    Collection Time: 03/16/25  1:38 PM   Result Value Ref Range    GFR (CKD-EPI) 65 >60 mL/min/1.73 m 2   D-DIMER    Collection Time: 03/16/25  1:38 PM   Result Value Ref Range    D-Dimer 1.60 (H) 0.00 - 0.50 ug/mL (FEU)   POC CoV-2, FLU A/B, RSV by PCR    Collection Time: 03/16/25  2:41 PM   Result Value Ref  Range    POC Influenza A RNA, PCR Negative Negative    POC Influenza B RNA, PCR Negative Negative    POC RSV, by PCR Negative Negative    POC SARS-CoV-2, PCR NotDetected NotDetected   EKG    Collection Time: 25  2:41 PM   Result Value Ref Range    Report       [facility] Emergency Dept.    Test Date:  2025  Pt Name:    ELIA LOPEZ                Department: ER  MRN:        [id number]                      Room:  Gender:     Male                         Technician: [id number]  :        1955                   Requested By:ER TRIAGE PROTOCOL  Order #:    [id number]                    Reading MD: Sarah Mg    Measurements  Intervals                                Axis  Rate:       69                           P:          41  HI:         215                          QRS:        -40  QRSD:       88                           T:          58  QT:         384  QTc:        412    Interpretive Statements  Sinus rhythm rate 69  Q waves V2, V4  No ST elevation or depression  Borderline prolonged HI interval  Left axis deviation  Anterior infarct, old  No previous ECG available for comparison  Electronically Signed On 2025 14:41:04 PDT by Sarah Mg     DIAGNOSTIC ALCOHOL    Collection Time: 25  2:48 PM   Result Value Ref Range    Diagnostic Alcohol <10.1 <10.1 mg/dL   TROPONIN    Collection Time: 25  2:48 PM   Result Value Ref Range    Troponin T 23 (H) 6 - 19 ng/L   Lactic Acid    Collection Time: 25  7:09 PM   Result Value Ref Range    Lactic Acid 2.5 (H) 0.5 - 2.0 mmol/L   Lactic Acid    Collection Time: 25  8:54 PM   Result Value Ref Range    Lactic Acid 2.8 (H) 0.5 - 2.0 mmol/L   URINALYSIS (UA)    Collection Time: 25  8:54 PM    Specimen: Urine   Result Value Ref Range    Color Yellow     Character Clear     Specific Gravity 1.018 <1.035    Ph 5.5 5.0 - 8.0    Glucose Negative Negative mg/dL    Ketones Negative Negative mg/dL    Protein Negative Negative  mg/dL    Bilirubin Negative Negative    Urobilinogen, Urine 1.0 <=1.0 EU/dL    Nitrite Negative Negative    Leukocyte Esterase Small (A) Negative    Occult Blood Negative Negative    Micro Urine Req Microscopic    URINE CULTURE(NEW)    Collection Time: 25  8:54 PM    Specimen: Urine   Result Value Ref Range    Significant Indicator NEG     Source UR     Site -     Culture Result -    URINE MICROSCOPIC (W/UA)    Collection Time: 25  8:54 PM   Result Value Ref Range    WBC 0-2 /hpf    RBC 0-2 0 - 2 /hpf    Bacteria Rare (A) None /hpf    Epithelial Cells 0-2 0 - 5 /hpf    Urine Casts 6-10 (A) 0 - 2 /lpf    Hyaline Cast Present /lpf   TROPONIN    Collection Time: 25 10:28 PM   Result Value Ref Range    Troponin T 26 (H) 6 - 19 ng/L   EKG (NOW)    Collection Time: 25 11:58 PM   Result Value Ref Range    Report       [facility] Emergency Dept.    Test Date:  2025  Pt Name:    ELIA LOPEZ                Department: ER  MRN:        [id number]                      Room:       [facility]  Gender:     Male                         Technician: [id number]  :        1955                   Requested By:BENJAMIN MG  Order #:    [id number]                    Reading MD: Benjamin Mg    Measurements  Intervals                                Axis  Rate:       92                           P:          7  NM:         [id number]                          QRS:        -26  QRSD:       88                           T:          68  QT:         368  QTc:        456    Interpretive Statements  Sinus rhythm rate 92  Normal intervals  Borderline left axis deviation  T waves flat I, AVL, III, V2  No ST elevation or depression  Abnormal R-wave progression, early transition  Nonspecific T abnrm, anterolateral leads  Baseline wander in lead(s) V1,V3,V4  Compared to ECG 2025 13:19:08  Q waves no longer present     Electronically Signed On 2025 23:58:06 PDT by Benjamin Mg        I have independently  interpreted this EKG      RADIOLOGY/PROCEDURES   The attending emergency physician has independently interpreted the diagnostic imaging associated with this visit and am waiting the final reading from the radiologist.   My preliminary interpretation is a follows: there is increased interstitial markings and elevated right sean-diaphragm. No old chest x-rays or EKGs to compare.     Radiologist interpretation:  US-RUQ   Final Result      1.  Limited exam.   2.  Gallstones and gallbladder wall thickening suggesting acute cholecystitis.   3.  No biliary dilation.         CT-CTA CHEST PULMONARY ARTERY W/ RECONS   Final Result      1.  No CT evidence of pulmonary embolism.      2.  Cholelithiasis.      3.  Small hiatal hernia.            DX-CHEST-PORTABLE (1 VIEW)   Final Result         No acute cardiac or pulmonary abnormality is identified.          COURSE & MEDICAL DECISION MAKING     ASSESSMENT, COURSE AND PLAN  Care Narrative: Patient presents to the ER with complaint of chest pain with some shortness of breath and lightheadedness which began 2 hours ago while he was driving his vehicle.  Upon arrival to the ER the patient is having shaking chills.  His skin feels slightly warm to touch.  As he was undergoing his workup for chest pain, he spiked a temperature of 101.  Chest pain evaluation reveals a nonacute EKG without any evidence of ST elevation or depression.  Initial troponin came back mildly elevated at 24.  Repeat troponin came back a little lower at 23.  The patient said that the nitroglycerin did seem to help his chest pain, but he said when the chest pain seem to go away, he started having pain in his upper abdomen.  Initially he did not have any abdominal pain, but then started to report pain in his right upper quadrant.  Repeat abdominal examination reveals tenderness in the right upper quadrant and midepigastrium.  The CT scan of the chest which was done for his complaint of chest pain with elevated  D-dimer 1.6, reveals gallstones.  It was at that point I decided to proceed with a right upper quadrant ultrasound since the patient was spiking fever and I was concerned that he could have a cholecystitis.  Right upper quadrant ultrasound does indeed reveal cholelithiasis with what looks like a cholecystitis as patient has a thickened gallbladder wall.  There is no pericholecystic fluid.  Patient was given Rocephin and Flagyl for presumed cholecystitis.  He had much more tenderness in the right upper quadrant when compared to the right lower quadrant and I have low suspicion for appendicitis at this time.  Patient had just eaten an [employer] roast beef sandwich prior to onset of his pain.  At this time I think the evolution of his symptoms and the development of tenderness in the right upper quadrant with fever is all related to a cholecystitis, which was identified on right upper quadrant ultrasound.  Urinalysis is negative for any signs of infection.  Since patient was having shaking chills upon arrival, blood cultures and urine culture were ordered and are pending.  Lactic acid level did come back a little bit high.  Patient was given 2 L of lactated Ringer's.  COVID, flu and RSV are negative.  No evidence of pneumonia or PE on CT scan.  At this time I think patient will need to be admitted for cholecystitis.  I spoke with Dr. Osorio, general surgeon on-call.  He has kindly come down to the ER to see the patient and will admit the patient to the acute surgical care service.    2:14 PM - Patient seen and examined at bedside. This is a 69 year old man who presents to the emergency department for evaluation of chest pain, shortness of breath and dizziness that started about 2 hours ago. The patient reports that he first developed chest pain, then dizziness and felt as though he was going to pass out. He then developed shortness of breath. He has chronic back pain, but states that his back pain has worsened slightly  since his chest pain started. Nursing did also mention that the patient stated he recently returned from a cruise. Discussed plan of care, including performing an EKG, lab work and imaging. Patient agrees to the plan of care. Ordered for EKG, Troponin, proBNP, CMP, CBC w/ Diff., POCT CoV-2 Flu A/B and RSV PCR, Diagnostic Alcohol, D-Dimer and DX-Chest to evaluate his symptoms.      3:57 PM - Ordered CT-CTA Chest Pulmonary Artery w/.     6:01 PM - Ordered US-RUQ.     6:49 PM - The patient spiked a fever of 101 °F and is now tachycardic at 109. The patient also has gallstones. Nursing also informed me that the patient desatted to 90% and is now on 2 liters, improving to 93%. Ordered a repeat troponin, Blood Culture x2, and Lactic Acid.     8:36 PM - The patient was reevaluated at bedside.  O2 sat is now 94% on room air.  At this time patient says he has very minimal chest pain.  Now he has upper abdominal pain.  He said that he noticed his abdominal pain once his chest pain resolved. He is tender in the right upper quadrant and mid epigastrium on repeat examination.  He is now complaining more of upper abdominal pain than chest pain. The patient will require hospitalization for cholecystitis. He was given an opportunity to ask questions. He is agreeable and understanding to the new plan of care.      2151: Paged surgery    2212: Discussed with Dr. Hollis, general surgeon.  He will come down and see the patient in consultation.  He will admit the patient primarily to his service.    CHEST PAIN:   HEART Score for Major Cardiac Events  HEART Score     History: Slightly suspicious  ECG: Normal  Age: 65+  Risk Factors: 1-2 risk factors  Troponin: 1-3 times normal limit    Heart Score: 4    Total Score   0-3 Points = Low Score, risk of MACE 0.9-1.7%.  4-6 Points = Moderate Score, risk of MACE 12-16.6%  7-10 Points = High Score, risk of MACE 50-65%    ADDITIONAL PROBLEM LIST  Problem #1: Chest pain, shortness of breath and  lightheadedness which began 2 hours prior to arrival.    DISPOSITION AND DISCUSSIONS  I have discussed management of the patient with the following physicians and BEN's: General Surgery    Discussion of management with other QHP or appropriate source(s): None     Escalation of care considered, and ultimately not performed: diagnostic imaging.  Patient did not initially have any abdominal pain.  Upper abdominal pain developed upon resolution of his chest pain.  Urinalysis is clear.  He has since developed a fever and tenderness in the right upper quadrant.  Ultrasound of the gallbladder shows gallstones with what looks like a cholecystitis.  Low suspicion for appendicitis or infected ureteral stone.  At this time I do not think he needs a CT scan of the abdomen or the pelvis.    Barriers to care at this time, including but not limited to:  None known .     Decision tools and prescription drugs considered including, but not limited to: Antibiotics patient was given Rocephin and Flagyl for cholecystitis. .    DISPOSITION:  Patient will be hospitalized by Dr. Hollis in guarded condition.     FINAL DIAGNOSIS  1. Chest pain, unspecified type Acute   2. Cholecystitis Acute      I, Cristela Borges (Scribe), am scribing for, and in the presence of, Sarah Mg M.D..    Electronically signed by: Cristela Borges (Scribe), 3/16/2025    ISarah M.D. personally performed the services described in this documentation, as scribed by Cristela Borges in my presence, and it is both accurate and complete.     This dictation has been created using voice recognition software. The accuracy of the dictation is limited by the abilities of the software. I expect there may be some errors of grammar and possibly content. I made every attempt to manually correct the errors within my dictation. However, errors related to voice recognition software may still exist and should be interpreted within the  appropriate context.      The note accurately reflects work and decisions made by me.  Sarah Mg M.D.  3/16/2025  9:56 PM

## 2025-03-17 ENCOUNTER — APPOINTMENT (OUTPATIENT)
Dept: RADIOLOGY | Facility: MEDICAL CENTER | Age: 70
DRG: 418 | End: 2025-03-17
Attending: SURGERY
Payer: MEDICARE

## 2025-03-17 ENCOUNTER — ANESTHESIA EVENT (OUTPATIENT)
Dept: SURGERY | Facility: MEDICAL CENTER | Age: 70
DRG: 418 | End: 2025-03-17
Payer: MEDICARE

## 2025-03-17 ENCOUNTER — ANESTHESIA (OUTPATIENT)
Dept: SURGERY | Facility: MEDICAL CENTER | Age: 70
DRG: 418 | End: 2025-03-17
Payer: MEDICARE

## 2025-03-17 LAB
ALBUMIN SERPL BCP-MCNC: 3.3 G/DL (ref 3.2–4.9)
ALBUMIN/GLOB SERPL: 1.4 G/DL
ALP SERPL-CCNC: 65 U/L (ref 30–99)
ALT SERPL-CCNC: 348 U/L (ref 2–50)
ANION GAP SERPL CALC-SCNC: 13 MMOL/L (ref 7–16)
AST SERPL-CCNC: 297 U/L (ref 12–45)
BASOPHILS # BLD AUTO: 0.2 % (ref 0–1.8)
BASOPHILS # BLD: 0.03 K/UL (ref 0–0.12)
BILIRUB SERPL-MCNC: 2.1 MG/DL (ref 0.1–1.5)
BUN SERPL-MCNC: 23 MG/DL (ref 8–22)
CALCIUM ALBUM COR SERPL-MCNC: 9.2 MG/DL (ref 8.5–10.5)
CALCIUM SERPL-MCNC: 8.6 MG/DL (ref 8.5–10.5)
CHLORIDE SERPL-SCNC: 105 MMOL/L (ref 96–112)
CO2 SERPL-SCNC: 19 MMOL/L (ref 20–33)
CREAT SERPL-MCNC: 1.23 MG/DL (ref 0.5–1.4)
EOSINOPHIL # BLD AUTO: 0.08 K/UL (ref 0–0.51)
EOSINOPHIL NFR BLD: 0.6 % (ref 0–6.9)
ERYTHROCYTE [DISTWIDTH] IN BLOOD BY AUTOMATED COUNT: 46.7 FL (ref 35.9–50)
GFR SERPLBLD CREATININE-BSD FMLA CKD-EPI: 63 ML/MIN/1.73 M 2
GLOBULIN SER CALC-MCNC: 2.4 G/DL (ref 1.9–3.5)
GLUCOSE BLD STRIP.AUTO-MCNC: 107 MG/DL (ref 65–99)
GLUCOSE BLD STRIP.AUTO-MCNC: 142 MG/DL (ref 65–99)
GLUCOSE BLD STRIP.AUTO-MCNC: 150 MG/DL (ref 65–99)
GLUCOSE BLD STRIP.AUTO-MCNC: 180 MG/DL (ref 65–99)
GLUCOSE BLD STRIP.AUTO-MCNC: 194 MG/DL (ref 65–99)
GLUCOSE SERPL-MCNC: 137 MG/DL (ref 65–99)
HCT VFR BLD AUTO: 39 % (ref 42–52)
HGB BLD-MCNC: 12.9 G/DL (ref 14–18)
IMM GRANULOCYTES # BLD AUTO: 0.05 K/UL (ref 0–0.11)
IMM GRANULOCYTES NFR BLD AUTO: 0.4 % (ref 0–0.9)
LACTATE SERPL-SCNC: 2.3 MMOL/L (ref 0.5–2)
LYMPHOCYTES # BLD AUTO: 0.74 K/UL (ref 1–4.8)
LYMPHOCYTES NFR BLD: 5.7 % (ref 22–41)
MAGNESIUM SERPL-MCNC: 1.7 MG/DL (ref 1.5–2.5)
MCH RBC QN AUTO: 30.6 PG (ref 27–33)
MCHC RBC AUTO-ENTMCNC: 33.1 G/DL (ref 32.3–36.5)
MCV RBC AUTO: 92.4 FL (ref 81.4–97.8)
MONOCYTES # BLD AUTO: 0.8 K/UL (ref 0–0.85)
MONOCYTES NFR BLD AUTO: 6.2 % (ref 0–13.4)
NEUTROPHILS # BLD AUTO: 11.29 K/UL (ref 1.82–7.42)
NEUTROPHILS NFR BLD: 86.9 % (ref 44–72)
NRBC # BLD AUTO: 0 K/UL
NRBC BLD-RTO: 0 /100 WBC (ref 0–0.2)
PATHOLOGY CONSULT NOTE: NORMAL
PHOSPHATE SERPL-MCNC: 3.9 MG/DL (ref 2.5–4.5)
PLATELET # BLD AUTO: 198 K/UL (ref 164–446)
PMV BLD AUTO: 10.2 FL (ref 9–12.9)
POTASSIUM SERPL-SCNC: 3.4 MMOL/L (ref 3.6–5.5)
PROT SERPL-MCNC: 5.7 G/DL (ref 6–8.2)
RBC # BLD AUTO: 4.22 M/UL (ref 4.7–6.1)
SODIUM SERPL-SCNC: 137 MMOL/L (ref 135–145)
WBC # BLD AUTO: 13 K/UL (ref 4.8–10.8)

## 2025-03-17 PROCEDURE — 160041 HCHG SURGERY MINUTES - EA ADDL 1 MIN LEVEL 4: Performed by: SURGERY

## 2025-03-17 PROCEDURE — G0378 HOSPITAL OBSERVATION PER HR: HCPCS

## 2025-03-17 PROCEDURE — 88304 TISSUE EXAM BY PATHOLOGIST: CPT | Performed by: PATHOLOGY

## 2025-03-17 PROCEDURE — 700101 HCHG RX REV CODE 250: Performed by: ANESTHESIOLOGY

## 2025-03-17 PROCEDURE — 700111 HCHG RX REV CODE 636 W/ 250 OVERRIDE (IP): Mod: JZ | Performed by: ANESTHESIOLOGY

## 2025-03-17 PROCEDURE — A9270 NON-COVERED ITEM OR SERVICE: HCPCS | Performed by: SURGERY

## 2025-03-17 PROCEDURE — 80053 COMPREHEN METABOLIC PANEL: CPT

## 2025-03-17 PROCEDURE — 84100 ASSAY OF PHOSPHORUS: CPT

## 2025-03-17 PROCEDURE — 160035 HCHG PACU - 1ST 60 MINS PHASE I: Performed by: SURGERY

## 2025-03-17 PROCEDURE — 160015 HCHG STAT PREOP MINUTES: Performed by: SURGERY

## 2025-03-17 PROCEDURE — 96372 THER/PROPH/DIAG INJ SC/IM: CPT

## 2025-03-17 PROCEDURE — BF502Z0 OTHER IMAGING OF BILE DUCTS USING FLUORESCING AGENT, INTRAOPERATIVE: ICD-10-PCS | Performed by: SURGERY

## 2025-03-17 PROCEDURE — 160048 HCHG OR STATISTICAL LEVEL 1-5: Performed by: SURGERY

## 2025-03-17 PROCEDURE — 700111 HCHG RX REV CODE 636 W/ 250 OVERRIDE (IP): Performed by: ANESTHESIOLOGY

## 2025-03-17 PROCEDURE — 160029 HCHG SURGERY MINUTES - 1ST 30 MINS LEVEL 4: Performed by: SURGERY

## 2025-03-17 PROCEDURE — 700117 HCHG RX CONTRAST REV CODE 255: Performed by: SURGERY

## 2025-03-17 PROCEDURE — 700111 HCHG RX REV CODE 636 W/ 250 OVERRIDE (IP): Mod: TB | Performed by: SURGERY

## 2025-03-17 PROCEDURE — 96375 TX/PRO/DX INJ NEW DRUG ADDON: CPT

## 2025-03-17 PROCEDURE — 88304 TISSUE EXAM BY PATHOLOGIST: CPT | Mod: 26 | Performed by: PATHOLOGY

## 2025-03-17 PROCEDURE — 82962 GLUCOSE BLOOD TEST: CPT | Mod: 91

## 2025-03-17 PROCEDURE — 96366 THER/PROPH/DIAG IV INF ADDON: CPT

## 2025-03-17 PROCEDURE — 700102 HCHG RX REV CODE 250 W/ 637 OVERRIDE(OP): Performed by: ANESTHESIOLOGY

## 2025-03-17 PROCEDURE — 0FT44ZZ RESECTION OF GALLBLADDER, PERCUTANEOUS ENDOSCOPIC APPROACH: ICD-10-PCS | Performed by: SURGERY

## 2025-03-17 PROCEDURE — 47563 LAPARO CHOLECYSTECTOMY/GRAPH: CPT | Mod: AS | Performed by: PHYSICIAN ASSISTANT

## 2025-03-17 PROCEDURE — 110371 HCHG SHELL REV 272: Performed by: SURGERY

## 2025-03-17 PROCEDURE — 160002 HCHG RECOVERY MINUTES (STAT): Performed by: SURGERY

## 2025-03-17 PROCEDURE — 96376 TX/PRO/DX INJ SAME DRUG ADON: CPT

## 2025-03-17 PROCEDURE — A9270 NON-COVERED ITEM OR SERVICE: HCPCS | Performed by: ANESTHESIOLOGY

## 2025-03-17 PROCEDURE — 83735 ASSAY OF MAGNESIUM: CPT

## 2025-03-17 PROCEDURE — 700102 HCHG RX REV CODE 250 W/ 637 OVERRIDE(OP): Performed by: SURGERY

## 2025-03-17 PROCEDURE — 700101 HCHG RX REV CODE 250: Performed by: SURGERY

## 2025-03-17 PROCEDURE — 85025 COMPLETE CBC W/AUTO DIFF WBC: CPT

## 2025-03-17 PROCEDURE — 74300 X-RAY BILE DUCTS/PANCREAS: CPT

## 2025-03-17 PROCEDURE — 47563 LAPARO CHOLECYSTECTOMY/GRAPH: CPT | Performed by: SURGERY

## 2025-03-17 PROCEDURE — 160009 HCHG ANES TIME/MIN: Performed by: SURGERY

## 2025-03-17 PROCEDURE — 700105 HCHG RX REV CODE 258: Performed by: SURGERY

## 2025-03-17 RX ORDER — HYDROMORPHONE HYDROCHLORIDE 1 MG/ML
0.1 INJECTION, SOLUTION INTRAMUSCULAR; INTRAVENOUS; SUBCUTANEOUS
Status: DISCONTINUED | OUTPATIENT
Start: 2025-03-17 | End: 2025-03-17 | Stop reason: HOSPADM

## 2025-03-17 RX ORDER — ROCURONIUM BROMIDE 10 MG/ML
INJECTION, SOLUTION INTRAVENOUS PRN
Status: DISCONTINUED | OUTPATIENT
Start: 2025-03-17 | End: 2025-03-17 | Stop reason: SURG

## 2025-03-17 RX ORDER — MIDAZOLAM HYDROCHLORIDE 1 MG/ML
INJECTION INTRAMUSCULAR; INTRAVENOUS PRN
Status: DISCONTINUED | OUTPATIENT
Start: 2025-03-17 | End: 2025-03-17 | Stop reason: SURG

## 2025-03-17 RX ORDER — HYDROMORPHONE HYDROCHLORIDE 1 MG/ML
0.4 INJECTION, SOLUTION INTRAMUSCULAR; INTRAVENOUS; SUBCUTANEOUS
Status: DISCONTINUED | OUTPATIENT
Start: 2025-03-17 | End: 2025-03-17 | Stop reason: HOSPADM

## 2025-03-17 RX ORDER — MEPERIDINE HYDROCHLORIDE 25 MG/ML
6.25 INJECTION INTRAMUSCULAR; INTRAVENOUS; SUBCUTANEOUS
Status: DISCONTINUED | OUTPATIENT
Start: 2025-03-17 | End: 2025-03-17 | Stop reason: HOSPADM

## 2025-03-17 RX ORDER — OXYCODONE HCL 5 MG/5 ML
5 SOLUTION, ORAL ORAL
Status: COMPLETED | OUTPATIENT
Start: 2025-03-17 | End: 2025-03-17

## 2025-03-17 RX ORDER — METOPROLOL TARTRATE 1 MG/ML
1 INJECTION, SOLUTION INTRAVENOUS
Status: DISCONTINUED | OUTPATIENT
Start: 2025-03-17 | End: 2025-03-17 | Stop reason: HOSPADM

## 2025-03-17 RX ORDER — SODIUM CHLORIDE, SODIUM LACTATE, POTASSIUM CHLORIDE, CALCIUM CHLORIDE 600; 310; 30; 20 MG/100ML; MG/100ML; MG/100ML; MG/100ML
INJECTION, SOLUTION INTRAVENOUS CONTINUOUS
Status: DISCONTINUED | OUTPATIENT
Start: 2025-03-17 | End: 2025-03-17 | Stop reason: HOSPADM

## 2025-03-17 RX ORDER — HYDRALAZINE HYDROCHLORIDE 20 MG/ML
INJECTION INTRAMUSCULAR; INTRAVENOUS PRN
Status: DISCONTINUED | OUTPATIENT
Start: 2025-03-17 | End: 2025-03-17 | Stop reason: SURG

## 2025-03-17 RX ORDER — ONDANSETRON 2 MG/ML
4 INJECTION INTRAMUSCULAR; INTRAVENOUS
Status: DISCONTINUED | OUTPATIENT
Start: 2025-03-17 | End: 2025-03-17 | Stop reason: HOSPADM

## 2025-03-17 RX ORDER — LIDOCAINE HYDROCHLORIDE 20 MG/ML
INJECTION, SOLUTION EPIDURAL; INFILTRATION; INTRACAUDAL; PERINEURAL PRN
Status: DISCONTINUED | OUTPATIENT
Start: 2025-03-17 | End: 2025-03-17 | Stop reason: SURG

## 2025-03-17 RX ORDER — HALOPERIDOL 5 MG/ML
1 INJECTION INTRAMUSCULAR
Status: DISCONTINUED | OUTPATIENT
Start: 2025-03-17 | End: 2025-03-17 | Stop reason: HOSPADM

## 2025-03-17 RX ORDER — DIPHENHYDRAMINE HYDROCHLORIDE 50 MG/ML
12.5 INJECTION, SOLUTION INTRAMUSCULAR; INTRAVENOUS
Status: DISCONTINUED | OUTPATIENT
Start: 2025-03-17 | End: 2025-03-17 | Stop reason: HOSPADM

## 2025-03-17 RX ORDER — OXYCODONE HCL 5 MG/5 ML
10 SOLUTION, ORAL ORAL
Status: COMPLETED | OUTPATIENT
Start: 2025-03-17 | End: 2025-03-17

## 2025-03-17 RX ORDER — EPHEDRINE SULFATE 50 MG/ML
5 INJECTION, SOLUTION INTRAVENOUS
Status: DISCONTINUED | OUTPATIENT
Start: 2025-03-17 | End: 2025-03-17 | Stop reason: HOSPADM

## 2025-03-17 RX ORDER — HYDRALAZINE HYDROCHLORIDE 20 MG/ML
10 INJECTION INTRAMUSCULAR; INTRAVENOUS EVERY 6 HOURS PRN
Status: DISCONTINUED | OUTPATIENT
Start: 2025-03-17 | End: 2025-03-19 | Stop reason: HOSPADM

## 2025-03-17 RX ORDER — CEFAZOLIN SODIUM 1 G/3ML
INJECTION, POWDER, FOR SOLUTION INTRAMUSCULAR; INTRAVENOUS PRN
Status: DISCONTINUED | OUTPATIENT
Start: 2025-03-17 | End: 2025-03-17 | Stop reason: SURG

## 2025-03-17 RX ORDER — BUPIVACAINE HYDROCHLORIDE AND EPINEPHRINE 5; 5 MG/ML; UG/ML
INJECTION, SOLUTION EPIDURAL; INTRACAUDAL; PERINEURAL
Status: DISCONTINUED | OUTPATIENT
Start: 2025-03-17 | End: 2025-03-17 | Stop reason: HOSPADM

## 2025-03-17 RX ORDER — ALBUTEROL SULFATE 5 MG/ML
2.5 SOLUTION RESPIRATORY (INHALATION)
Status: DISCONTINUED | OUTPATIENT
Start: 2025-03-17 | End: 2025-03-17 | Stop reason: HOSPADM

## 2025-03-17 RX ORDER — DEXAMETHASONE SODIUM PHOSPHATE 4 MG/ML
INJECTION, SOLUTION INTRA-ARTICULAR; INTRALESIONAL; INTRAMUSCULAR; INTRAVENOUS; SOFT TISSUE PRN
Status: DISCONTINUED | OUTPATIENT
Start: 2025-03-17 | End: 2025-03-17 | Stop reason: SURG

## 2025-03-17 RX ORDER — SUCCINYLCHOLINE CHLORIDE 20 MG/ML
INJECTION INTRAMUSCULAR; INTRAVENOUS PRN
Status: DISCONTINUED | OUTPATIENT
Start: 2025-03-17 | End: 2025-03-17 | Stop reason: SURG

## 2025-03-17 RX ORDER — HYDROMORPHONE HYDROCHLORIDE 1 MG/ML
0.2 INJECTION, SOLUTION INTRAMUSCULAR; INTRAVENOUS; SUBCUTANEOUS
Status: DISCONTINUED | OUTPATIENT
Start: 2025-03-17 | End: 2025-03-17 | Stop reason: HOSPADM

## 2025-03-17 RX ORDER — HYDRALAZINE HYDROCHLORIDE 20 MG/ML
5 INJECTION INTRAMUSCULAR; INTRAVENOUS
Status: DISCONTINUED | OUTPATIENT
Start: 2025-03-17 | End: 2025-03-17 | Stop reason: HOSPADM

## 2025-03-17 RX ADMIN — ROCURONIUM BROMIDE 10 MG: 10 INJECTION INTRAVENOUS at 10:12

## 2025-03-17 RX ADMIN — INSULIN LISPRO 1 UNITS: 100 INJECTION, SOLUTION INTRAVENOUS; SUBCUTANEOUS at 18:20

## 2025-03-17 RX ADMIN — OXYCODONE HYDROCHLORIDE 10 MG: 5 SOLUTION ORAL at 10:41

## 2025-03-17 RX ADMIN — ROCURONIUM BROMIDE 3 MG: 10 INJECTION INTRAVENOUS at 09:22

## 2025-03-17 RX ADMIN — DOCUSATE SODIUM 100 MG: 100 CAPSULE, LIQUID FILLED ORAL at 01:08

## 2025-03-17 RX ADMIN — CEFAZOLIN 2 G: 1 INJECTION, POWDER, FOR SOLUTION INTRAMUSCULAR; INTRAVENOUS at 09:27

## 2025-03-17 RX ADMIN — DEXAMETHASONE SODIUM PHOSPHATE 8 MG: 4 INJECTION INTRA-ARTICULAR; INTRALESIONAL; INTRAMUSCULAR; INTRAVENOUS; SOFT TISSUE at 09:47

## 2025-03-17 RX ADMIN — METRONIDAZOLE 500 MG: 5 INJECTION, SOLUTION INTRAVENOUS at 01:04

## 2025-03-17 RX ADMIN — FENTANYL CITRATE 50 MCG: 50 INJECTION, SOLUTION INTRAMUSCULAR; INTRAVENOUS at 10:42

## 2025-03-17 RX ADMIN — ACETAMINOPHEN 1000 MG: 500 TABLET ORAL at 01:07

## 2025-03-17 RX ADMIN — HYDROMORPHONE HYDROCHLORIDE 0.2 MG: 1 INJECTION, SOLUTION INTRAMUSCULAR; INTRAVENOUS; SUBCUTANEOUS at 11:22

## 2025-03-17 RX ADMIN — FENTANYL CITRATE 50 MCG: 50 INJECTION, SOLUTION INTRAMUSCULAR; INTRAVENOUS at 09:59

## 2025-03-17 RX ADMIN — INSULIN LISPRO 1 UNITS: 100 INJECTION, SOLUTION INTRAVENOUS; SUBCUTANEOUS at 23:38

## 2025-03-17 RX ADMIN — METRONIDAZOLE 500 MG: 5 INJECTION, SOLUTION INTRAVENOUS at 18:15

## 2025-03-17 RX ADMIN — PROPOFOL 180 MG: 10 INJECTION, EMULSION INTRAVENOUS at 09:22

## 2025-03-17 RX ADMIN — FENTANYL CITRATE 50 MCG: 50 INJECTION, SOLUTION INTRAMUSCULAR; INTRAVENOUS at 10:50

## 2025-03-17 RX ADMIN — SODIUM CHLORIDE, POTASSIUM CHLORIDE, SODIUM LACTATE AND CALCIUM CHLORIDE: 600; 310; 30; 20 INJECTION, SOLUTION INTRAVENOUS at 18:15

## 2025-03-17 RX ADMIN — DOCUSATE SODIUM 100 MG: 100 CAPSULE, LIQUID FILLED ORAL at 18:05

## 2025-03-17 RX ADMIN — SUCCINYLCHOLINE CHLORIDE 120 MG: 20 INJECTION, SOLUTION INTRAMUSCULAR; INTRAVENOUS at 09:22

## 2025-03-17 RX ADMIN — SODIUM CHLORIDE, POTASSIUM CHLORIDE, SODIUM LACTATE AND CALCIUM CHLORIDE: 600; 310; 30; 20 INJECTION, SOLUTION INTRAVENOUS at 01:01

## 2025-03-17 RX ADMIN — FENTANYL CITRATE 50 MCG: 50 INJECTION, SOLUTION INTRAMUSCULAR; INTRAVENOUS at 09:40

## 2025-03-17 RX ADMIN — FAMOTIDINE 20 MG: 20 TABLET, FILM COATED ORAL at 18:05

## 2025-03-17 RX ADMIN — HYDROMORPHONE HYDROCHLORIDE 0.2 MG: 1 INJECTION, SOLUTION INTRAMUSCULAR; INTRAVENOUS; SUBCUTANEOUS at 11:15

## 2025-03-17 RX ADMIN — FAMOTIDINE 20 MG: 20 TABLET, FILM COATED ORAL at 01:08

## 2025-03-17 RX ADMIN — HYDROMORPHONE HYDROCHLORIDE 0.5 MG: 1 INJECTION, SOLUTION INTRAMUSCULAR; INTRAVENOUS; SUBCUTANEOUS at 12:34

## 2025-03-17 RX ADMIN — OXYCODONE 5 MG: 5 TABLET ORAL at 01:06

## 2025-03-17 RX ADMIN — HYDRALAZINE HYDROCHLORIDE 5 MG: 20 INJECTION, SOLUTION INTRAMUSCULAR; INTRAVENOUS at 10:04

## 2025-03-17 RX ADMIN — MIDAZOLAM HYDROCHLORIDE 2 MG: 1 INJECTION, SOLUTION INTRAMUSCULAR; INTRAVENOUS at 09:19

## 2025-03-17 RX ADMIN — DOCUSATE SODIUM 100 MG: 100 CAPSULE, LIQUID FILLED ORAL at 05:35

## 2025-03-17 RX ADMIN — CELECOXIB 200 MG: 200 CAPSULE ORAL at 01:07

## 2025-03-17 RX ADMIN — ACETAMINOPHEN 1000 MG: 500 TABLET ORAL at 23:31

## 2025-03-17 RX ADMIN — FENTANYL CITRATE 50 MCG: 50 INJECTION, SOLUTION INTRAMUSCULAR; INTRAVENOUS at 10:18

## 2025-03-17 RX ADMIN — SENNOSIDES AND DOCUSATE SODIUM 1 TABLET: 50; 8.6 TABLET ORAL at 01:08

## 2025-03-17 RX ADMIN — SENNOSIDES AND DOCUSATE SODIUM 1 TABLET: 50; 8.6 TABLET ORAL at 19:41

## 2025-03-17 RX ADMIN — FAMOTIDINE 20 MG: 20 TABLET, FILM COATED ORAL at 05:35

## 2025-03-17 RX ADMIN — MAGNESIUM HYDROXIDE 30 ML: 2400 SUSPENSION ORAL at 18:02

## 2025-03-17 RX ADMIN — OXYCODONE HYDROCHLORIDE 10 MG: 10 TABLET ORAL at 18:06

## 2025-03-17 RX ADMIN — CELECOXIB 200 MG: 200 CAPSULE ORAL at 05:34

## 2025-03-17 RX ADMIN — ACETAMINOPHEN 1000 MG: 500 TABLET ORAL at 18:04

## 2025-03-17 RX ADMIN — METRONIDAZOLE 500 MG: 5 INJECTION, SOLUTION INTRAVENOUS at 05:37

## 2025-03-17 RX ADMIN — CEFTRIAXONE SODIUM 2000 MG: 10 INJECTION, POWDER, FOR SOLUTION INTRAVENOUS at 06:17

## 2025-03-17 RX ADMIN — POLYETHYLENE GLYCOL 3350 1 PACKET: 17 POWDER, FOR SOLUTION ORAL at 18:03

## 2025-03-17 RX ADMIN — ENOXAPARIN SODIUM 40 MG: 100 INJECTION SUBCUTANEOUS at 18:06

## 2025-03-17 RX ADMIN — LIDOCAINE HYDROCHLORIDE 50 MG: 20 INJECTION, SOLUTION EPIDURAL; INFILTRATION; INTRACAUDAL; PERINEURAL at 09:22

## 2025-03-17 RX ADMIN — HYDROMORPHONE HYDROCHLORIDE 0.2 MG: 1 INJECTION, SOLUTION INTRAMUSCULAR; INTRAVENOUS; SUBCUTANEOUS at 11:28

## 2025-03-17 RX ADMIN — ROCURONIUM BROMIDE 47 MG: 10 INJECTION INTRAVENOUS at 09:30

## 2025-03-17 RX ADMIN — HYDROMORPHONE HYDROCHLORIDE 0.4 MG: 1 INJECTION, SOLUTION INTRAMUSCULAR; INTRAVENOUS; SUBCUTANEOUS at 11:00

## 2025-03-17 RX ADMIN — FENTANYL CITRATE 50 MCG: 50 INJECTION, SOLUTION INTRAMUSCULAR; INTRAVENOUS at 10:37

## 2025-03-17 RX ADMIN — FENTANYL CITRATE 50 MCG: 50 INJECTION, SOLUTION INTRAMUSCULAR; INTRAVENOUS at 09:49

## 2025-03-17 RX ADMIN — ACETAMINOPHEN 1000 MG: 500 TABLET ORAL at 05:34

## 2025-03-17 RX ADMIN — CELECOXIB 200 MG: 200 CAPSULE ORAL at 18:05

## 2025-03-17 SDOH — ECONOMIC STABILITY: TRANSPORTATION INSECURITY
IN THE PAST 12 MONTHS, HAS THE LACK OF TRANSPORTATION KEPT YOU FROM MEDICAL APPOINTMENTS OR FROM GETTING MEDICATIONS?: NO

## 2025-03-17 SDOH — ECONOMIC STABILITY: TRANSPORTATION INSECURITY
IN THE PAST 12 MONTHS, HAS LACK OF RELIABLE TRANSPORTATION KEPT YOU FROM MEDICAL APPOINTMENTS, MEETINGS, WORK OR FROM GETTING THINGS NEEDED FOR DAILY LIVING?: NO

## 2025-03-17 ASSESSMENT — COGNITIVE AND FUNCTIONAL STATUS - GENERAL
MOBILITY SCORE: 24
SUGGESTED CMS G CODE MODIFIER MOBILITY: CH
DAILY ACTIVITIY SCORE: 24
SUGGESTED CMS G CODE MODIFIER DAILY ACTIVITY: CH

## 2025-03-17 ASSESSMENT — PATIENT HEALTH QUESTIONNAIRE - PHQ9
2. FEELING DOWN, DEPRESSED, IRRITABLE, OR HOPELESS: NOT AT ALL
SUM OF ALL RESPONSES TO PHQ9 QUESTIONS 1 AND 2: 0
1. LITTLE INTEREST OR PLEASURE IN DOING THINGS: NOT AT ALL

## 2025-03-17 ASSESSMENT — PAIN DESCRIPTION - PAIN TYPE
TYPE: SURGICAL PAIN
TYPE: ACUTE PAIN
TYPE: SURGICAL PAIN
TYPE: ACUTE PAIN;SURGICAL PAIN
TYPE: ACUTE PAIN
TYPE: SURGICAL PAIN
TYPE: ACUTE PAIN
TYPE: ACUTE PAIN
TYPE: SURGICAL PAIN
TYPE: ACUTE PAIN

## 2025-03-17 ASSESSMENT — SOCIAL DETERMINANTS OF HEALTH (SDOH)
WITHIN THE LAST YEAR, HAVE YOU BEEN KICKED, HIT, SLAPPED, OR OTHERWISE PHYSICALLY HURT BY YOUR PARTNER OR EX-PARTNER?: NO
IN THE PAST 12 MONTHS, HAS THE ELECTRIC, GAS, OIL, OR WATER COMPANY THREATENED TO SHUT OFF SERVICE IN YOUR HOME?: NO
WITHIN THE LAST YEAR, HAVE TO BEEN RAPED OR FORCED TO HAVE ANY KIND OF SEXUAL ACTIVITY BY YOUR PARTNER OR EX-PARTNER?: NO
WITHIN THE PAST 12 MONTHS, THE FOOD YOU BOUGHT JUST DIDN'T LAST AND YOU DIDN'T HAVE MONEY TO GET MORE: NEVER TRUE
WITHIN THE PAST 12 MONTHS, YOU WORRIED THAT YOUR FOOD WOULD RUN OUT BEFORE YOU GOT THE MONEY TO BUY MORE: NEVER TRUE
WITHIN THE LAST YEAR, HAVE YOU BEEN HUMILIATED OR EMOTIONALLY ABUSED IN OTHER WAYS BY YOUR PARTNER OR EX-PARTNER?: NO
WITHIN THE LAST YEAR, HAVE YOU BEEN AFRAID OF YOUR PARTNER OR EX-PARTNER?: NO

## 2025-03-17 ASSESSMENT — LIFESTYLE VARIABLES
EVER FELT BAD OR GUILTY ABOUT YOUR DRINKING: NO
CONSUMPTION TOTAL: NEGATIVE
AVERAGE NUMBER OF DAYS PER WEEK YOU HAVE A DRINK CONTAINING ALCOHOL: 1
ALCOHOL_USE: YES
TOTAL SCORE: 0
HAVE YOU EVER FELT YOU SHOULD CUT DOWN ON YOUR DRINKING: NO
HAVE PEOPLE ANNOYED YOU BY CRITICIZING YOUR DRINKING: NO
EVER HAD A DRINK FIRST THING IN THE MORNING TO STEADY YOUR NERVES TO GET RID OF A HANGOVER: NO
TOTAL SCORE: 0
DOES PATIENT WANT TO STOP DRINKING: NO
ON A TYPICAL DAY WHEN YOU DRINK ALCOHOL HOW MANY DRINKS DO YOU HAVE: 1
HOW MANY TIMES IN THE PAST YEAR HAVE YOU HAD 5 OR MORE DRINKS IN A DAY: 0
TOTAL SCORE: 0

## 2025-03-17 ASSESSMENT — FIBROSIS 4 INDEX: FIB4 SCORE: 5.63

## 2025-03-17 ASSESSMENT — COPD QUESTIONNAIRES
DO YOU EVER COUGH UP ANY MUCUS OR PHLEGM?: NO/ONLY WITH OCCASIONAL COLDS OR INFECTIONS
DURING THE PAST 4 WEEKS HOW MUCH DID YOU FEEL SHORT OF BREATH: NONE/LITTLE OF THE TIME
HAVE YOU SMOKED AT LEAST 100 CIGARETTES IN YOUR ENTIRE LIFE: YES
COPD SCREENING SCORE: 4

## 2025-03-17 NOTE — H&P
CHIEF COMPLAINT: Right upper quadrant pain     HISTORY OF PRESENT ILLNESS: The patient is a 69 year-old White elderly man who presents to the Emergency Department a several-hour history of severe right subcostal abdominal pain. The pain is associated with malaise and dyspnea . He initially complained more of chest pain than abdominal pain and underwent a cardiac work-up after arrival. Over time his pain localized more to the right subcostal abdomen and he developed fevers and tachycardia. His cardiac work-up was unremarkable. He denies any food intolerance or temporal relationship between symptoms and eating.  He denies a family history of gallstones. The patient denies any recent or intercurrent illness. The patient denies any history of previous abdominal surgery.    PAST MEDICAL HISTORY:  has a past medical history of Acute pain, BPH (benign prostatic hyperplasia), Chickenpox, Colon polyp (2007, 2014), ED (erectile dysfunction), Thai measles, endoscopy (11/30/2007), Hyperlipidemia (12/21/2015), Hypertension, Obesity, Personal history of allergy to shellfish, Sleep apnea, and Spinal stenosis of lumbar region with neurogenic claudication (09/09/2022).    He has no past medical history of Acute nasopharyngitis, Adverse effect of anesthesia, Anesthesia, Anginal syndrome (Formerly [facility]), Arrhythmia, Arthritis, Asthma, Awareness under anesthesia, Blood clotting disorder (Formerly [facility]), Bowel habit changes, Breath shortness, Bronchitis, Cancer (Formerly [facility]), Carcinoma in situ of respiratory system, Cataract, Congestive heart failure (Formerly [facility]), Continuous ambulatory peritoneal dialysis status (Formerly [facility]), COPD (chronic obstructive pulmonary disease) (Formerly [facility]), Coughing blood, Delayed emergence from general anesthesia, Dental disorder, Diabetes (Formerly [facility]), Dialysis patient (Formerly [facility]), Disorder of thyroid, Emphysema of lung (HCC), Glaucoma, Gynecological disorder, Hard to intubate, Heart burn, Heart murmur, Heart valve disease, Hemorrhagic disorder (Formerly [facility]), Hepatitis  A, Hepatitis B, Hepatitis C, Hiatus hernia syndrome, High cholesterol, Indigestion, Jaundice, Malignant hyperthermia, Myocardial infarct (HCC), Pacemaker, Pneumonia, PONV (postoperative nausea and vomiting), Pregnant, Pseudocholinesterase deficiency, Psychiatric problem, Renal disorder, Rheumatic fever, Seizure (HCC), Snoring, Spinal headache, Stroke (HCC), Tuberculosis, Urinary bladder disorder, or Urinary incontinence.    PAST SURGICAL HISTORY:  has a past surgical history that includes tonsillectomy; lumbar interlaminar epidural steroid injection (Bilateral, 11/2/2021); other; lumbar interlaminar epidural steroid injection (Bilateral, 4/21/2022); lumbar interlaminar epidural steroid injection (Bilateral, 7/7/2022); lumbar interlaminar epidural steroid injection (Bilateral, 10/20/2022); pr inj dx/ther agnt paravert facet joint, jameson* (Bilateral, 1/12/2023); lumbar interlaminar epidural steroid injection (Bilateral, 3/16/2023); and pr lumbar spine fusion,anter apprch (N/A, 5/1/2023).    ALLERGIES: No Known Allergies    CURRENT MEDICATIONS:    Home Medications       Reviewed by Indira Benoit (Pharmacy Tech) on 03/16/25 at 2056  Med List Status: Complete     Medication Last Dose Status   Acetaminophen (TYLENOL PO) 3/15/2025 Active   lisinopril-hydrochlorothiazide (PRINZIDE) 10-12.5 MG per tablet 3/16/2025 Active   meloxicam (MOBIC) 7.5 MG Tab 3/16/2025 Active   sildenafil citrate (VIAGRA) 100 MG tablet 3/9/2025 Active   tamsulosin (FLOMAX) 0.4 MG capsule 3/16/2025 Active                  Audit from Redirected Encounters    **Home medications have not yet been reviewed for this encounter**         FAMILY HISTORY: family history includes Cancer in his sister; Stroke in his father.    SOCIAL HISTORY:  reports that he quit smoking about 33 years ago. His smoking use included cigarettes. He started smoking about 53 years ago. He has a 6 pack-year smoking history. He has never used smokeless tobacco. He reports  "current alcohol use of about 6.0 oz of alcohol per week. He reports that he does not use drugs.    REVIEW OF SYSTEMS: Comprehensive review of systems is negative with the exception of the aforementioned HPI, PMH, and PSH bullets in accordance with CMS guidelines.    PHYSICAL EXAMINATION:      Constitutional:     Vital Signs: /65   Pulse 97   Temp (!) 38.4 °C (101.1 °F) (Oral)   Resp 18   Ht 1.854 m (6' 1\")   Wt 118 kg (259 lb 4.2 oz)   SpO2 95%    General Appearance: is in no apparent distress.  HEENT: The pupils are equal, round, and reactive to light bilaterally. The extraocular muscles are intact bilaterally. The sclera are anicteric. Nares and oropharynx are clear.   Neck: Supple. No adenopathy.  Respiratory:   Inspection: Unlabored respirations, no intercostal retractions, paradoxical motion, or accessory muscle use.   Auscultation: normal.  Cardiovascular:   Inspection: The skin is warm and well purfused.  Auscultation: Sinus tachycardia.   Peripheral Pulses: Normal.   Abdomen:  Inspection: Abdominal inspection reveals no abrasions, contusions, lacerations or penetrating wounds.   Palpation: Palpation is remarkable for mild tenderness in the right upper quadrant  region. No abdominal wall hernias.  Extremities:   Examination of the upper and lower extremities demonstrates no cyanosis edema or clubbing.  Neurologic:   Alert & oriented to person, time and place. Normal motor function. Normal sensory function. No focal deficits noted.    LABORATORY VALUES:   Recent Labs     03/16/25  1338   WBC 10.8   RBC 4.86   HEMOGLOBIN 15.3   HEMATOCRIT 46.0   MCV 94.7   MCH 31.5   MCHC 33.3   RDW 46.4   PLATELETCT 240   MPV 9.7     Recent Labs     03/16/25  1338   SODIUM 141   POTASSIUM 3.8   CHLORIDE 104   CO2 25   GLUCOSE 132*   BUN 20   CREATININE 1.19   CALCIUM 9.6     Recent Labs     03/16/25  1338   ASTSGOT 111*   ALTSGPT 94*   TBILIRUBIN 1.0   ALKPHOSPHAT 58   GLOBULIN 2.9            IMAGING:   US-RUQ "   Final Result      1.  Limited exam.   2.  Gallstones and gallbladder wall thickening suggesting acute cholecystitis.   3.  No biliary dilation.         CT-CTA CHEST PULMONARY ARTERY W/ RECONS   Final Result      1.  No CT evidence of pulmonary embolism.      2.  Cholelithiasis.      3.  Small hiatal hernia.            DX-CHEST-PORTABLE (1 VIEW)   Final Result         No acute cardiac or pulmonary abnormality is identified.          ASSESSMENT AND PLAN:     * Cholecystitis- (present on admission)  Assessment & Plan  Several hours of severe right subcostal pain associated with malaise and dyspnea.  Cardiac work-up with significant abnormality.  Tender to palpation in the right upper quadrant on exam. Febrile and tachycardic in the ER, no leukocytosis or elevated LFTs on initial labs.  Ultrasound with gallstones and gallbladder wall thickening to 5 mm, no pericholecystic fluid, normal caliber CBD.  Admit to chavez, NPO, IV fluids and antibiotics.  To OR in the morning for laparoscopic cholecystectomy.    Essential hypertension- (present on admission)  Assessment & Plan  Chronic condition treated with lisinopril-hydrochlorothiazide 10-12.5 mg daily.  Holding maintenance medication during acute septic illness.    Spinal stenosis of lumbar region with neurogenic claudication- (present on admission)  Assessment & Plan  Chronic condition treated with meloxicam 7.5 mg daily.  Holding maintenance medication during acute septic illness.        The patient has Grade I (mild) acute cholecystitis. Plan: Laparoscopic cholecystectomy.    The patient will be taken to the operating room for Laparoscopic cholecystectomy. The surgical conduct was discussed in detail. Potential complications including, but not limited to, infection, bleeding, damage to adjacent structures, bile duct injury, need to convert to an open procedure, and anesthetic complications were discussed. Questions were elicited and answered to the patient's  satisfaction.  Operative consent signed.     [facility]ITION[facility].     ____________________________________     Dominguez Hollis M.D.    DD: 3/16/2025  10:04 PM    [facility] Guidelines for Acute Cholecystitis 2018  AAST Grading System for EGS Conditions  ACS NSQIP Surgical Risk Calculator

## 2025-03-17 NOTE — ED NOTES
Patient report he is still having chills, mild nausea and weakness. CP is worse when his is sitting up. He is most comfortable lying flat. Family at bedside.   Chart up for re-eval.

## 2025-03-17 NOTE — ED NOTES
Bedside report received from off going RN/tech: Raine, assumed care of patient.  POC discussed with patient. Call light within reach, all needs addressed at this time.       Fall risk interventions in place: Patient's personal possessions are with in their safe reach, Place socks on patient, Keep floor surfaces clean and dry, and Accompanied to restroom (all applicable per [place] Fall risk assessment)   Continuous monitoring: Cardiac Leads, Pulse Ox, or Blood Pressure  IVF/IV medications: Infusion per MAR (List Med(s)) LR  Oxygen: How many liters 2L  Bedside sitter: Not Applicable   Isolation: Not Applicable

## 2025-03-17 NOTE — CARE PLAN
The patient is Stable - Low risk of patient condition declining or worsening    Shift Goals  Clinical Goals: pain management, NPO, surgery in AM  Patient Goals: pain management, rest    Progress made toward(s) clinical / shift goals:  Pain managed with pharmacological and non-pharmacological pain management methods throughout shift. Patient NPO since midnight. Preparation for surgery in AM completed including: CHG bath complete, patient voided, educated patient to only wear hospital gown (no personal belongings to be brought to surgery), and report given to pre-op nurse. Patient rested comfortably intermittently throughout shift.     Problem: Pain - Standard  Goal: Alleviation of pain or a reduction in pain to the patient’s comfort goal  Outcome: Progressing     Problem: Knowledge Deficit - Standard  Goal: Patient and family/care givers will demonstrate understanding of plan of care, disease process/condition, diagnostic tests and medications  Outcome: Progressing       Patient is not progressing towards the following goals:

## 2025-03-17 NOTE — ANESTHESIA TIME REPORT
Anesthesia Start and Stop Event Times       Date Time Event    3/17/2025 0915 Ready for Procedure     0917 Anesthesia Start     1040 Anesthesia Stop          Responsible Staff  03/17/25      Name Role Begin End    Tra Corral M.D. Anesth 0917 1040          Overtime Reason:  no overtime (within assigned shift)    Comments:

## 2025-03-17 NOTE — ED NOTES
Med Rec complete per patient and spouse at bedside   Allergies reviewed  Antibiotics in the past 30 days:no  Anticoagulant in past 14 days:no  Pharmacy patient utilizes:[facility] in [place]    Pt typically take Meloxicam PRN but has taken it daily this past week due to knee pain    Spouse states pt is taking Lisinopril 10 mg however per chart review Lisinopril-HCTZ 10-12.5 mg has been regularly dispensed every 90 days

## 2025-03-17 NOTE — ASSESSMENT & PLAN NOTE
Chronic condition treated with meloxicam 7.5 mg daily.  Holding maintenance medication during acute septic illness.

## 2025-03-17 NOTE — OR NURSING
1037 Pt arrived to PACU with Anesthesiologist and OR RN. Even, unlabored respirations. VSS.  Abdominal lap sites CDI. Ice pack applied.     1045 AAOx4. Pt medicated for pain, no nausea    1118 POC update given to wife, Carmelina, over the phone. All questions answered.     1136 Report called to GABRIELE Wu on GSU.     1148 Pain tolerable per pt, AAOx4, lap sites CDI, Pt transported to T423 with transport. Chart with patient.

## 2025-03-17 NOTE — ANESTHESIA PREPROCEDURE EVALUATION
Case: [id number] Date/Time: 03/17/25 0835    Procedure: CHOLECYSTECTOMY, LAPAROSCOPIC    Location: [facility] OR 09 / SURGERY [facility]    Surgeons: Ismael Rodriguez M.D.          Bmi 34  Lfts elevated  Relevant Problems   ANESTHESIA   (positive) WESTON (obstructive sleep apnea)      CARDIAC   (positive) Essential hypertension       Physical Exam    Airway   Mallampati: II  TM distance: >3 FB  Neck ROM: full       Cardiovascular - normal exam  Rhythm: regular  Rate: normal  (-) murmur     Dental - normal exam           Pulmonary - normal exam  Breath sounds clear to auscultation     Abdominal    Neurological - normal exam         Other findings: Opens well, front teeth prominent              Anesthesia Plan    ASA 2       Plan - general       Airway plan will be ETT          Induction: intravenous    Postoperative Plan: Postoperative administration of opioids is intended.    Pertinent diagnostic labs and testing reviewed    Informed Consent:    Anesthetic plan and risks discussed with patient.    Use of blood products discussed with: patient whom consented to blood products.

## 2025-03-17 NOTE — ASSESSMENT & PLAN NOTE
Chronic condition treated with lisinopril-hydrochlorothiazide 10-12.5 mg daily.  Resumed maintenance medication on admission.  PRN IV antihypertensives.

## 2025-03-17 NOTE — PROGRESS NOTES
Pt left unit to pre-op with transport  CHG completed  Pre-op report given by TONYA RN  Wife at bedside  1 bag of belongings, watch, glasses and phone left at bedside

## 2025-03-17 NOTE — PROGRESS NOTES
4 Eyes Skin Assessment Completed by GABRIELE Downs and GABRIELE Curtis.    Head WDL  Ears WDL  Nose WDL  Mouth WDL  Neck WDL  Breast/Chest WDL  Shoulder Blades WDL  Spine Old scar (healed)  (R) Arm/Elbow/Hand WDL  (L) Arm/Elbow/Hand WDL  Abdomen WDL  Groin WDL  Scrotum/Coccyx/Buttocks WDL  (R) Leg WDL  (L) Leg Incision  (R) Heel/Foot/Toe WDL  (L) Heel/Foot/Toe WDL      Devices In Places Blood Pressure Cuff and Pulse Ox      Interventions In Place Pillows and Pressure Redistribution Mattress    Possible Skin Injury No    Pictures Uploaded Into Epic N/A  Wound Consult Placed N/A  RN Wound Prevention Protocol Ordered No

## 2025-03-17 NOTE — ED NOTES
Report given to receiving RN. Patient transported to [facility] with transport tech. All belongings in possession left with patient.     done

## 2025-03-17 NOTE — OP REPORT
DATE OF OPERATION:   3/17/2025     PREOPERATIVE DIAGNOSIS: Right upper quadrant pain, cholelithiasis, acute cholecystitis, and hyperbilirubinemia.    POSTOPERATIVE DIAGNOSIS: Right upper quadrant pain, acute cholecystitis, and hyperbilirubinemia.    PROCEDURE PERFORMED: Laparoscopic cholecystectomy and intraoperative cholangiogram    SURGEON:    Ismael Rodriguez M.D.    ASSISTANT:    Lucy Jacob PA-C.     ANESTHESIOLOGIST:  Tra Corral M.D.    ANESTHESIA:   General endotracheal anesthesia.    ASA CLASSIFICATION:  II.    INDICATIONS: The patient is a 69 year-old man with clinical and radiographic findings of acute cholecystitis and hyperbilirubinemia. He is taken to the operating room for a laparoscopic cholecystectomy and intraoperative cholangiography.     The nature of the surgical procedure warranted additional skilled operative assistance from a licensed Physician Assistant (MIGUELINA). The assistant was present during the entire operation. The surgical assistant performed the following: provided assistance with optimal surgical exposure of the operative field, provided high complexity, subspecialty decision making input, operated the camera for the laparoscopic portion of the procedure, assisted with the surgical resection, and performed the skin closure and dressing application.     FINDINGS: Gallbladder wall thickening and acute inflammation. Multiple gallstones. Intraoperative cholangiography demonstrated initial resistance to flow and dilated biliary ducts. Contrast flowed freely into the duodenum with no residual filling defects, duct narrowing, or extravasation.     WOUND CLASSIFICATION:  Class III, Contaminated.    SPECIMEN:    Gallbladder.    ESTIMATED BLOOD LOSS:  20 mL.    PROCEDURE: Following informed consent consent, the patient was properly identified, taken to the operating room and placed in supine position where general endotracheal anesthesia was administered. Intravenous antibiotics  were administered by the anesthesiologist in correct time interval. The patient voided prior to surgery. A urinary catheter was not placed. Sequential compression devices were employed. The abdomen was prepped and draped into a sterile field. A timeout was conducted with the full attention and participation of all operating room personnel.    Marcaine 0.5% was used to infiltrate the port sites. A 5 mm infraumbilical midline incision was made and the subcutaneous tissues spread bluntly. The fascia was elevated with a hook and a Veress needle was atraumatically inserted. Carbon dioxide pneumoperitoneum was instilled. A 5 mm separator port was passed. A 5 mm 30 degree lens and camera was passed into the peritoneal cavity. An 11 mm port was placed in the epigastric midline under direct vision. Two 5 mm right subcostal ports were placed under direct vision.     The gallbladder was identified and elevated. Dissection was carried out to completely expose and delineate the hepatocystic triangle. The critical view was achieved definitively identifying the single cystic duct and single cystic artery entering the gallbladder. The cystic artery was multiply clipped proximally, once distally and divided. The cystic duct was clipped distally and a proximal ductotomy made with hook scissors. A pre-flushed cholangiocatheter was passed percutaneously through a 2 mm right subcostal incision. The ductotomy was cannulated and a transcystic cholangiogram was performed under fluoroscopy. Findings are detailed above. The cholangiocatheter was withdrawn. The proximal cystic duct was secured with multiple clips and divided. The gallbladder was dissected free from the undersurface of the liver using electrocautery and placed within a laparoscopic specimen retrieval bag. The gallbladder was delivered intact from the abdominal cavity and submitted for pathology. The gallbladder fossa was irrigated. All excess irrigant was evacuated from the  abdominal cavity. The gallbladder fossa was inspected. Hemostasis was satisfactory.    The epigastric port site fascia was approximated using a trocar site closure device with a 0 VICRYL® Plus Antibacterial suture. The abdomen was desufflated and the ports were removed.  The port site skin incisions were closed with interrupted 4-0 VICRYL® Plus Antibacterial subcuticular sutures. A DERMABOND ADVANCED® Topical Skin Adhesive dressing was applied.    The patient tolerated the procedure well, and there were no apparent complications. All sponge, needle, and instrument counts were correct on 2 separate occasions. The patient was awakened, extubated, and transferred to  to the post anesthesia care unit (PACU) in satisfactory condition.       ____________________________________     Ismael Rodriguez M.D.    DD: 3/17/2025  8:49 AM

## 2025-03-17 NOTE — ANESTHESIA PROCEDURE NOTES
Airway    Date/Time: 3/17/2025 9:24 AM    Performed by: Tra Corral M.D.  Authorized by: Tra Corral M.D.    Location:  OR  Urgency:  Elective  Indications for Airway Management:  Anesthesia      Spontaneous Ventilation: absent    Sedation Level:  Deep  Preoxygenated: Yes    Patient Position:  Sniffing  Final Airway Type:  Endotracheal airway  Final Endotracheal Airway:  ETT  Cuffed: Yes    Technique Used for Successful ETT Placement:  Direct laryngoscopy  Devices/Methods Used in Placement:  Cricoid pressure and intubating stylet    Insertion Site:  Oral  Blade Type:  Daniels  Laryngoscope Blade/Videolaryngoscope Blade Size:  2  ETT Size (mm):  8.0  Measured from:  Teeth  ETT to Teeth (cm):  22  Placement Verified by: auscultation and capnometry    Cormack-Lehane Classification:  Grade IIa - partial view of glottis  Number of Attempts at Approach:  1   Grade 2a with cricoid pressure, otherwise grade 2b

## 2025-03-17 NOTE — ASSESSMENT & PLAN NOTE
Several hours of severe right subcostal pain associated with malaise and dyspnea.  Febrile and tachycardic in the ER, no leukocytosis or elevated LFTs on initial labs.  Ultrasound with gallstones and gallbladder wall thickening to 5 mm, no pericholecystic fluid, normal caliber CBD.  3/18 Lap ivy with IOC.

## 2025-03-17 NOTE — PROGRESS NOTES
"Patient arrived to [facility] room T423 from ED in wheelchair with transport.   Report received.  Assessment complete.  A&O x 4. Patient calls appropriately.  Patient ambulates with standby assist.   Patient has 1/10 pain. Patient declines interventions at this time.    Denies N&V. Patient NPO at this time.   + void, + flatus, - BM (last bowel movement 3/16).  Patient denies SOB. On room air.   SCD's off. Patient ambulatory.     /56   Pulse 95   Temp (!) 38.4 °C (101.1 °F) (Oral)   Resp 18   Ht 1.854 m (6' 1\")   Wt 118 kg (259 lb 4.2 oz)   SpO2 95%   BMI 34.21 kg/m²     Review plan with of care with patient. Call light and personal belongings within reach. Hourly rounding in place. All needs met at this time.    "

## 2025-03-17 NOTE — ANESTHESIA POSTPROCEDURE EVALUATION
Patient: Vince Chavez    Procedure Summary       Date: 03/17/25 Room / Location: [facility] 09 / SURGERY [facility]    Anesthesia Start: 0917 Anesthesia Stop: 1040    Procedure: CHOLECYSTECTOMY, LAPAROSCOPIC, INTRAOPERATIVE CHOLANGIOGRAPHY (Abdomen) Diagnosis: (ACUTE CHOLECYSTITIS, HYPERBILIRUBINEMIA)    Surgeons: Ismael Rodriguez M.D. Responsible Provider: Tra Corral M.D.    Anesthesia Type: general ASA Status: 2            Final Anesthesia Type: general  Last vitals  BP   Blood Pressure : (!) 198/90 (Rn notified)    Temp   36.7 °C (98.1 °F)    Pulse   71   Resp   18    SpO2   91 %      Anesthesia Post Evaluation    Patient location during evaluation: PACU  Patient participation: complete - patient participated  Level of consciousness: awake and alert    Airway patency: patent  Anesthetic complications: no  Cardiovascular status: hemodynamically stable  Respiratory status: acceptable  Hydration status: euvolemic    PONV: none          There were no known notable events for this encounter.     Nurse Pain Score: 5 (NPRS)

## 2025-03-17 NOTE — ED NOTES
Patient ambulatory to the bathroom-- handheld assist. Patient states chest pain has improved to a 2/10. Temperature reassessed 101.1F oral. Patient placed back on monitors. 94% RA.

## 2025-03-18 PROBLEM — R78.81 BACTEREMIA: Status: ACTIVE | Noted: 2025-03-18

## 2025-03-18 LAB
ALBUMIN SERPL BCP-MCNC: 3.3 G/DL (ref 3.2–4.9)
ALBUMIN/GLOB SERPL: 1.2 G/DL
ALP SERPL-CCNC: 59 U/L (ref 30–99)
ALT SERPL-CCNC: 292 U/L (ref 2–50)
ANION GAP SERPL CALC-SCNC: 10 MMOL/L (ref 7–16)
AST SERPL-CCNC: 154 U/L (ref 12–45)
BASOPHILS # BLD AUTO: 0.2 % (ref 0–1.8)
BASOPHILS # BLD: 0.02 K/UL (ref 0–0.12)
BILIRUB SERPL-MCNC: 1.6 MG/DL (ref 0.1–1.5)
BUN SERPL-MCNC: 20 MG/DL (ref 8–22)
CALCIUM ALBUM COR SERPL-MCNC: 9.1 MG/DL (ref 8.5–10.5)
CALCIUM SERPL-MCNC: 8.5 MG/DL (ref 8.5–10.5)
CHLORIDE SERPL-SCNC: 106 MMOL/L (ref 96–112)
CO2 SERPL-SCNC: 21 MMOL/L (ref 20–33)
CREAT SERPL-MCNC: 0.95 MG/DL (ref 0.5–1.4)
EOSINOPHIL # BLD AUTO: 0.01 K/UL (ref 0–0.51)
EOSINOPHIL NFR BLD: 0.1 % (ref 0–6.9)
ERYTHROCYTE [DISTWIDTH] IN BLOOD BY AUTOMATED COUNT: 46.8 FL (ref 35.9–50)
GFR SERPLBLD CREATININE-BSD FMLA CKD-EPI: 86 ML/MIN/1.73 M 2
GLOBULIN SER CALC-MCNC: 2.7 G/DL (ref 1.9–3.5)
GLUCOSE BLD STRIP.AUTO-MCNC: 129 MG/DL (ref 65–99)
GLUCOSE SERPL-MCNC: 171 MG/DL (ref 65–99)
HCT VFR BLD AUTO: 38.7 % (ref 42–52)
HGB BLD-MCNC: 13.3 G/DL (ref 14–18)
IMM GRANULOCYTES # BLD AUTO: 0.07 K/UL (ref 0–0.11)
IMM GRANULOCYTES NFR BLD AUTO: 0.6 % (ref 0–0.9)
LIPASE SERPL-CCNC: 171 U/L (ref 11–82)
LYMPHOCYTES # BLD AUTO: 0.52 K/UL (ref 1–4.8)
LYMPHOCYTES NFR BLD: 4.4 % (ref 22–41)
MCH RBC QN AUTO: 31.7 PG (ref 27–33)
MCHC RBC AUTO-ENTMCNC: 34.4 G/DL (ref 32.3–36.5)
MCV RBC AUTO: 92.4 FL (ref 81.4–97.8)
MONOCYTES # BLD AUTO: 0.9 K/UL (ref 0–0.85)
MONOCYTES NFR BLD AUTO: 7.6 % (ref 0–13.4)
NEUTROPHILS # BLD AUTO: 10.32 K/UL (ref 1.82–7.42)
NEUTROPHILS NFR BLD: 87.1 % (ref 44–72)
NRBC # BLD AUTO: 0 K/UL
NRBC BLD-RTO: 0 /100 WBC (ref 0–0.2)
PLATELET # BLD AUTO: 145 K/UL (ref 164–446)
PMV BLD AUTO: 10.6 FL (ref 9–12.9)
POTASSIUM SERPL-SCNC: 3.8 MMOL/L (ref 3.6–5.5)
PROT SERPL-MCNC: 6 G/DL (ref 6–8.2)
RBC # BLD AUTO: 4.19 M/UL (ref 4.7–6.1)
SODIUM SERPL-SCNC: 137 MMOL/L (ref 135–145)
WBC # BLD AUTO: 11.8 K/UL (ref 4.8–10.8)

## 2025-03-18 PROCEDURE — 700111 HCHG RX REV CODE 636 W/ 250 OVERRIDE (IP): Mod: JZ | Performed by: NURSE PRACTITIONER

## 2025-03-18 PROCEDURE — 96372 THER/PROPH/DIAG INJ SC/IM: CPT

## 2025-03-18 PROCEDURE — 700102 HCHG RX REV CODE 250 W/ 637 OVERRIDE(OP): Performed by: PHYSICIAN ASSISTANT

## 2025-03-18 PROCEDURE — A9270 NON-COVERED ITEM OR SERVICE: HCPCS | Performed by: NURSE PRACTITIONER

## 2025-03-18 PROCEDURE — A9270 NON-COVERED ITEM OR SERVICE: HCPCS | Performed by: PHYSICIAN ASSISTANT

## 2025-03-18 PROCEDURE — 83690 ASSAY OF LIPASE: CPT

## 2025-03-18 PROCEDURE — 700111 HCHG RX REV CODE 636 W/ 250 OVERRIDE (IP): Mod: JZ | Performed by: SURGERY

## 2025-03-18 PROCEDURE — A9270 NON-COVERED ITEM OR SERVICE: HCPCS | Performed by: SURGERY

## 2025-03-18 PROCEDURE — 700102 HCHG RX REV CODE 250 W/ 637 OVERRIDE(OP): Performed by: SURGERY

## 2025-03-18 PROCEDURE — 87040 BLOOD CULTURE FOR BACTERIA: CPT

## 2025-03-18 PROCEDURE — 99232 SBSQ HOSP IP/OBS MODERATE 35: CPT | Mod: 24 | Performed by: PHYSICIAN ASSISTANT

## 2025-03-18 PROCEDURE — 700105 HCHG RX REV CODE 258: Performed by: PHYSICIAN ASSISTANT

## 2025-03-18 PROCEDURE — 82962 GLUCOSE BLOOD TEST: CPT

## 2025-03-18 PROCEDURE — 85025 COMPLETE CBC W/AUTO DIFF WBC: CPT

## 2025-03-18 PROCEDURE — 770001 HCHG ROOM/CARE - MED/SURG/GYN PRIV*

## 2025-03-18 PROCEDURE — 700102 HCHG RX REV CODE 250 W/ 637 OVERRIDE(OP): Performed by: NURSE PRACTITIONER

## 2025-03-18 PROCEDURE — 96375 TX/PRO/DX INJ NEW DRUG ADDON: CPT

## 2025-03-18 PROCEDURE — 80053 COMPREHEN METABOLIC PANEL: CPT

## 2025-03-18 PROCEDURE — 96376 TX/PRO/DX INJ SAME DRUG ADON: CPT

## 2025-03-18 PROCEDURE — 700111 HCHG RX REV CODE 636 W/ 250 OVERRIDE (IP): Performed by: PHYSICIAN ASSISTANT

## 2025-03-18 RX ORDER — LISINOPRIL AND HYDROCHLOROTHIAZIDE 10; 12.5 MG/1; MG/1
1 TABLET ORAL DAILY
Status: DISCONTINUED | OUTPATIENT
Start: 2025-03-18 | End: 2025-03-18

## 2025-03-18 RX ORDER — TAMSULOSIN HYDROCHLORIDE 0.4 MG/1
0.4 CAPSULE ORAL DAILY
Status: DISCONTINUED | OUTPATIENT
Start: 2025-03-18 | End: 2025-03-19 | Stop reason: HOSPADM

## 2025-03-18 RX ORDER — HYDROCHLOROTHIAZIDE 12.5 MG/1
12.5 TABLET ORAL
Status: DISCONTINUED | OUTPATIENT
Start: 2025-03-18 | End: 2025-03-19 | Stop reason: HOSPADM

## 2025-03-18 RX ORDER — LISINOPRIL 10 MG/1
10 TABLET ORAL
Status: DISCONTINUED | OUTPATIENT
Start: 2025-03-18 | End: 2025-03-19 | Stop reason: HOSPADM

## 2025-03-18 RX ADMIN — CELECOXIB 200 MG: 200 CAPSULE ORAL at 17:26

## 2025-03-18 RX ADMIN — ONDANSETRON 4 MG: 2 INJECTION INTRAMUSCULAR; INTRAVENOUS at 09:51

## 2025-03-18 RX ADMIN — METRONIDAZOLE 500 MG: 5 INJECTION, SOLUTION INTRAVENOUS at 05:00

## 2025-03-18 RX ADMIN — HYDRALAZINE HYDROCHLORIDE 10 MG: 20 INJECTION INTRAMUSCULAR; INTRAVENOUS at 08:14

## 2025-03-18 RX ADMIN — CEFTRIAXONE SODIUM 2000 MG: 10 INJECTION, POWDER, FOR SOLUTION INTRAVENOUS at 04:48

## 2025-03-18 RX ADMIN — ENOXAPARIN SODIUM 40 MG: 100 INJECTION SUBCUTANEOUS at 17:26

## 2025-03-18 RX ADMIN — OXYCODONE 5 MG: 5 TABLET ORAL at 20:05

## 2025-03-18 RX ADMIN — Medication 5 MG: at 20:05

## 2025-03-18 RX ADMIN — TAMSULOSIN HYDROCHLORIDE 0.4 MG: 0.4 CAPSULE ORAL at 11:26

## 2025-03-18 RX ADMIN — ACETAMINOPHEN 1000 MG: 500 TABLET ORAL at 04:47

## 2025-03-18 RX ADMIN — SENNOSIDES AND DOCUSATE SODIUM 1 TABLET: 50; 8.6 TABLET ORAL at 20:06

## 2025-03-18 RX ADMIN — Medication 5 MG: at 00:21

## 2025-03-18 RX ADMIN — CELECOXIB 200 MG: 200 CAPSULE ORAL at 04:47

## 2025-03-18 RX ADMIN — MAGNESIUM HYDROXIDE 30 ML: 2400 SUSPENSION ORAL at 17:26

## 2025-03-18 RX ADMIN — DOCUSATE SODIUM 100 MG: 100 CAPSULE, LIQUID FILLED ORAL at 17:26

## 2025-03-18 RX ADMIN — POLYETHYLENE GLYCOL 3350 1 PACKET: 17 POWDER, FOR SOLUTION ORAL at 17:26

## 2025-03-18 RX ADMIN — LISINOPRIL 10 MG: 10 TABLET ORAL at 11:26

## 2025-03-18 RX ADMIN — FAMOTIDINE 20 MG: 20 TABLET, FILM COATED ORAL at 04:47

## 2025-03-18 RX ADMIN — DOCUSATE SODIUM 100 MG: 100 CAPSULE, LIQUID FILLED ORAL at 04:47

## 2025-03-18 RX ADMIN — FAMOTIDINE 20 MG: 20 TABLET, FILM COATED ORAL at 17:26

## 2025-03-18 RX ADMIN — ACETAMINOPHEN 1000 MG: 500 TABLET ORAL at 17:26

## 2025-03-18 RX ADMIN — ACETAMINOPHEN 1000 MG: 500 TABLET ORAL at 11:26

## 2025-03-18 RX ADMIN — HYDROCHLOROTHIAZIDE 12.5 MG: 12.5 TABLET ORAL at 11:26

## 2025-03-18 RX ADMIN — POLYETHYLENE GLYCOL 3350 1 PACKET: 17 POWDER, FOR SOLUTION ORAL at 04:48

## 2025-03-18 ASSESSMENT — PAIN DESCRIPTION - PAIN TYPE
TYPE: ACUTE PAIN
TYPE: ACUTE PAIN
TYPE: ACUTE PAIN;SURGICAL PAIN
TYPE: ACUTE PAIN;SURGICAL PAIN

## 2025-03-18 NOTE — PROGRESS NOTES
"  DATE: 3/18/2025    POD #1 s/p Lap ivy with IOC.    INTERVAL EVENTS:  Hypertensive with some nausea.  Passing flatus.  LFTs and T. bili trending down.  Positive blood cultures (E. Coli).    PHYSICAL EXAMINATION:  Vital Signs: BP (!) 169/89   Pulse 84   Temp 36.6 °C (97.9 °F) (Temporal)   Resp 18   Ht 1.854 m (6' 1\")   Wt 118 kg (259 lb 4.2 oz)   SpO2 91%     Physical Exam  Vitals and nursing note reviewed.   Constitutional:       General: He is awake. He is not in acute distress.     Appearance: Normal appearance. He is not ill-appearing or toxic-appearing.   Abdominal:      General: Abdomen is protuberant. There is no distension.      Palpations: Abdomen is soft.      Tenderness: There is abdominal tenderness (incisional).      Comments: 5 lap incisions CDI, approximated with dermabond   Neurological:      Mental Status: He is alert.   Psychiatric:         Behavior: Behavior is cooperative.       LABORATORY VALUES:  Recent Labs     03/16/25 1338 03/17/25 0224 03/18/25  0152   WBC 10.8 13.0* 11.8*   RBC 4.86 4.22* 4.19*   HEMOGLOBIN 15.3 12.9* 13.3*   HEMATOCRIT 46.0 39.0* 38.7*   MCV 94.7 92.4 92.4   MCH 31.5 30.6 31.7   MCHC 33.3 33.1 34.4   RDW 46.4 46.7 46.8   PLATELETCT 240 198 145*   MPV 9.7 10.2 10.6     Recent Labs     03/16/25 1338 03/17/25 0224 03/18/25  0152   SODIUM 141 137 137   POTASSIUM 3.8 3.4* 3.8   CHLORIDE 104 105 106   CO2 25 19* 21   GLUCOSE 132* 137* 171*   BUN 20 23* 20   CREATININE 1.19 1.23 0.95   CALCIUM 9.6 8.6 8.5     Recent Labs     03/16/25 1338 03/17/25 0224 03/18/25  0152   ASTSGOT 111* 297* 154*   ALTSGPT 94* 348* 292*   TBILIRUBIN 1.0 2.1* 1.6*   ALKPHOSPHAT 58 65 59   GLOBULIN 2.9 2.4 2.7            IMAGING:      ASSESSMENT AND PLAN:  * Cholecystitis- (present on admission)  Assessment & Plan  Several hours of severe right subcostal pain associated with malaise and dyspnea.  Febrile and tachycardic in the ER, no leukocytosis or elevated LFTs on initial " labs.  Ultrasound with gallstones and gallbladder wall thickening to 5 mm, no pericholecystic fluid, normal caliber CBD.  3/18 Lap ivy with IOC.    Bacteremia- (present on admission)  Assessment & Plan  Blood cultures (+) E. Coli.  Ceftriaxone extended to 7 days.  3/18 Repeat blood cultures.    Essential hypertension- (present on admission)  Assessment & Plan  Chronic condition treated with lisinopril-hydrochlorothiazide 10-12.5 mg daily.  Resumed maintenance medication on admission.  PRN IV antihypertensives.    Spinal stenosis of lumbar region with neurogenic claudication- (present on admission)  Assessment & Plan  Chronic condition treated with meloxicam 7.5 mg daily.  Holding maintenance medication during acute septic illness.      - Home medication added for hypertension  - Diet as tolerated  - Repeat blood cultures  - Continue ceftriaxone  - Anticipate patient discharging home next 1 to 2 days if stable exam and continued improvement in labs       ____________________________________     Lucy Jacob P.A.-C.    DD: 3/18/2025  9:23 AM

## 2025-03-18 NOTE — PROGRESS NOTES
Notified ROSANA Hammond of elevated blood pressure 172/83.     Orders received for PRN Hydralazine for systolic blood pressure greater than 180.

## 2025-03-18 NOTE — CARE PLAN
The patient is Stable - Low risk of patient condition declining or worsening    Shift Goals  Clinical Goals: tolerate diet, pain control, ambulate  Patient Goals: rest, comfort    Progress made toward(s) clinical / shift goals:  pt did not tolerate regular diet for breakfast, PA switched diet back to clear liquids d/t nausea. Medicated for nausea with prn per MAR with good effect. Declined pain medication, but understands availability if needed.  -BM. +flatus. Pt up with SBA. +void.  High BP noted, medicated per MAR and updated PA, home medications ordered. Pt able to ambulate frequently to restroom and out into hallway this shift with SBA.    Patient is not progressing towards the following goals:

## 2025-03-18 NOTE — PROGRESS NOTES
"Bedside report received.  Assessment complete.  A&O x 4. Patient calls appropriately.  Patient ambulates with a standby assist.   Patient has 5/10 pain. Patient ambulated and repositioned.   Denies N&V. Tolerating full liquid diet.  Patient has lap sites x 5 with dermabond. Sites are clean, dry, intact, and open to air.   + void, + flatus, - BM (last bowel movement 3/16 prior to arrival).  Patient states mild SOB. On room air.    SCD's off. Patient ambulatory.    BP (!) 169/90   Pulse 82   Temp 36.6 °C (97.9 °F) (Temporal)   Resp 18   Ht 1.854 m (6' 1\")   Wt 118 kg (259 lb 4.2 oz)   SpO2 96%   BMI 34.21 kg/m²     Review plan with of care with patient. Call light and personal belongings within reach. Hourly rounding in place. All needs met at this time.    "

## 2025-03-18 NOTE — PROGRESS NOTES
Pt back from procedure.  Wife at bedside  Pt c/o thirst, verified new diet order, provided water, pt tolerated  No other needs at this time  Call light within reach        Head WDL  Ears WDL  Nose WDL  Mouth WDL  Neck WDL  Breast/Chest WDL  Shoulder Blades WDL  Spine WDL  (R) Arm/Elbow/Hand WDL PIV  (L) Arm/Elbow/Hand WDL  Abdomen Incision x5 with dermabond  Groin WDL  Scrotum/Coccyx/Buttocks WDL  (R) Leg WDL  (L) Leg WDL  (R) Heel/Foot/Toe WDL  (L) Heel/Foot/Toe WDL          Devices In Places Pulse Ox and Nasal Cannula      Interventions In Place NC W/Ear Foams, Pillows, and Pressure Redistribution Mattress    Possible Skin Injury No    Pictures Uploaded Into Epic N/A  Wound Consult Placed N/A  RN Wound Prevention Protocol Ordered No

## 2025-03-18 NOTE — CARE PLAN
The patient is Stable - Low risk of patient condition declining or worsening    Shift Goals  Clinical Goals: prep for procedure, pain control, rest  Patient Goals: pain management, rest    Progress made toward(s) clinical / shift goals:  pt had procedure this shift and returned to the unit after. Medicating for pain prn per MAR. Wife at bedside. Pt tolerating clear liquid diet, requested to wait until morning to advance to a regular diet. +void  Pt is a moderate fall risk with all interventions in place including bed frame alarm, door sign, desk bed, frequent rounding, and calls appropriately.     Patient is not progressing towards the following goals:

## 2025-03-18 NOTE — CARE PLAN
The patient is Stable - Low risk of patient condition declining or worsening    Shift Goals  Clinical Goals: pain management, ambulation  Patient Goals: rest, comfort    Progress made toward(s) clinical / shift goals:  Pain managed with non-pharmacological and scheduled medications throughout shift. Patient ambulated up to bathroom throughout shift. Patient rested comfortably intermittently throughout shift.     Problem: Pain - Standard  Goal: Alleviation of pain or a reduction in pain to the patient’s comfort goal  Outcome: Progressing     Problem: Mobility  Goal: Patient's capacity to carry out activities will improve  Outcome: Progressing       Patient is not progressing towards the following goals:

## 2025-03-19 ENCOUNTER — PHARMACY VISIT (OUTPATIENT)
Dept: PHARMACY | Facility: MEDICAL CENTER | Age: 70
End: 2025-03-19
Payer: MEDICARE

## 2025-03-19 VITALS
HEART RATE: 84 BPM | DIASTOLIC BLOOD PRESSURE: 93 MMHG | HEIGHT: 73 IN | OXYGEN SATURATION: 93 % | RESPIRATION RATE: 19 BRPM | TEMPERATURE: 97.2 F | BODY MASS INDEX: 34.36 KG/M2 | SYSTOLIC BLOOD PRESSURE: 174 MMHG | WEIGHT: 259.26 LBS

## 2025-03-19 LAB
ALBUMIN SERPL BCP-MCNC: 3.4 G/DL (ref 3.2–4.9)
ALBUMIN/GLOB SERPL: 1.1 G/DL
ALP SERPL-CCNC: 86 U/L (ref 30–99)
ALT SERPL-CCNC: 185 U/L (ref 2–50)
ANION GAP SERPL CALC-SCNC: 11 MMOL/L (ref 7–16)
AST SERPL-CCNC: 62 U/L (ref 12–45)
BACTERIA BLD CULT: ABNORMAL
BACTERIA UR CULT: NORMAL
BASOPHILS # BLD AUTO: 0.3 % (ref 0–1.8)
BASOPHILS # BLD: 0.03 K/UL (ref 0–0.12)
BILIRUB SERPL-MCNC: 1.5 MG/DL (ref 0.1–1.5)
BUN SERPL-MCNC: 15 MG/DL (ref 8–22)
CALCIUM ALBUM COR SERPL-MCNC: 9 MG/DL (ref 8.5–10.5)
CALCIUM SERPL-MCNC: 8.5 MG/DL (ref 8.5–10.5)
CHLORIDE SERPL-SCNC: 103 MMOL/L (ref 96–112)
CO2 SERPL-SCNC: 22 MMOL/L (ref 20–33)
CREAT SERPL-MCNC: 0.78 MG/DL (ref 0.5–1.4)
EOSINOPHIL # BLD AUTO: 0.21 K/UL (ref 0–0.51)
EOSINOPHIL NFR BLD: 1.9 % (ref 0–6.9)
ERYTHROCYTE [DISTWIDTH] IN BLOOD BY AUTOMATED COUNT: 46.5 FL (ref 35.9–50)
GFR SERPLBLD CREATININE-BSD FMLA CKD-EPI: 96 ML/MIN/1.73 M 2
GLOBULIN SER CALC-MCNC: 3 G/DL (ref 1.9–3.5)
GLUCOSE SERPL-MCNC: 138 MG/DL (ref 65–99)
HCT VFR BLD AUTO: 39.2 % (ref 42–52)
HGB BLD-MCNC: 13 G/DL (ref 14–18)
IMM GRANULOCYTES # BLD AUTO: 0.07 K/UL (ref 0–0.11)
IMM GRANULOCYTES NFR BLD AUTO: 0.6 % (ref 0–0.9)
LYMPHOCYTES # BLD AUTO: 0.84 K/UL (ref 1–4.8)
LYMPHOCYTES NFR BLD: 7.7 % (ref 22–41)
MCH RBC QN AUTO: 30.7 PG (ref 27–33)
MCHC RBC AUTO-ENTMCNC: 33.2 G/DL (ref 32.3–36.5)
MCV RBC AUTO: 92.7 FL (ref 81.4–97.8)
MONOCYTES # BLD AUTO: 1.09 K/UL (ref 0–0.85)
MONOCYTES NFR BLD AUTO: 9.9 % (ref 0–13.4)
NEUTROPHILS # BLD AUTO: 8.74 K/UL (ref 1.82–7.42)
NEUTROPHILS NFR BLD: 79.6 % (ref 44–72)
NRBC # BLD AUTO: 0 K/UL
NRBC BLD-RTO: 0 /100 WBC (ref 0–0.2)
PLATELET # BLD AUTO: 188 K/UL (ref 164–446)
PMV BLD AUTO: 10.5 FL (ref 9–12.9)
POTASSIUM SERPL-SCNC: 3.6 MMOL/L (ref 3.6–5.5)
PROT SERPL-MCNC: 6.4 G/DL (ref 6–8.2)
RBC # BLD AUTO: 4.23 M/UL (ref 4.7–6.1)
SIGNIFICANT IND 70042: ABNORMAL
SIGNIFICANT IND 70042: ABNORMAL
SIGNIFICANT IND 70042: NORMAL
SITE SITE: ABNORMAL
SITE SITE: ABNORMAL
SITE SITE: NORMAL
SODIUM SERPL-SCNC: 136 MMOL/L (ref 135–145)
SOURCE SOURCE: ABNORMAL
SOURCE SOURCE: ABNORMAL
SOURCE SOURCE: NORMAL
WBC # BLD AUTO: 11 K/UL (ref 4.8–10.8)

## 2025-03-19 PROCEDURE — 85025 COMPLETE CBC W/AUTO DIFF WBC: CPT

## 2025-03-19 PROCEDURE — 80053 COMPREHEN METABOLIC PANEL: CPT

## 2025-03-19 PROCEDURE — A9270 NON-COVERED ITEM OR SERVICE: HCPCS | Performed by: SURGERY

## 2025-03-19 PROCEDURE — 700111 HCHG RX REV CODE 636 W/ 250 OVERRIDE (IP): Performed by: PHYSICIAN ASSISTANT

## 2025-03-19 PROCEDURE — 700102 HCHG RX REV CODE 250 W/ 637 OVERRIDE(OP): Performed by: SURGERY

## 2025-03-19 PROCEDURE — 700102 HCHG RX REV CODE 250 W/ 637 OVERRIDE(OP): Performed by: PHYSICIAN ASSISTANT

## 2025-03-19 PROCEDURE — RXMED WILLOW AMBULATORY MEDICATION CHARGE: Performed by: PHYSICIAN ASSISTANT

## 2025-03-19 PROCEDURE — A9270 NON-COVERED ITEM OR SERVICE: HCPCS | Performed by: PHYSICIAN ASSISTANT

## 2025-03-19 RX ORDER — CELECOXIB 200 MG/1
200 CAPSULE ORAL 2 TIMES DAILY
Qty: 14 CAPSULE | Refills: 0 | Status: SHIPPED | OUTPATIENT
Start: 2025-03-19 | End: 2025-03-26

## 2025-03-19 RX ADMIN — FAMOTIDINE 20 MG: 20 TABLET, FILM COATED ORAL at 06:14

## 2025-03-19 RX ADMIN — ACETAMINOPHEN 1000 MG: 500 TABLET ORAL at 06:14

## 2025-03-19 RX ADMIN — LISINOPRIL 10 MG: 10 TABLET ORAL at 06:14

## 2025-03-19 RX ADMIN — CELECOXIB 200 MG: 200 CAPSULE ORAL at 06:14

## 2025-03-19 RX ADMIN — HYDROCHLOROTHIAZIDE 12.5 MG: 12.5 TABLET ORAL at 06:15

## 2025-03-19 RX ADMIN — TAMSULOSIN HYDROCHLORIDE 0.4 MG: 0.4 CAPSULE ORAL at 06:15

## 2025-03-19 RX ADMIN — DOCUSATE SODIUM 100 MG: 100 CAPSULE, LIQUID FILLED ORAL at 06:15

## 2025-03-19 RX ADMIN — OXYCODONE 5 MG: 5 TABLET ORAL at 00:48

## 2025-03-19 RX ADMIN — ACETAMINOPHEN 1000 MG: 500 TABLET ORAL at 00:47

## 2025-03-19 RX ADMIN — CEFTRIAXONE SODIUM 2000 MG: 10 INJECTION, POWDER, FOR SOLUTION INTRAVENOUS at 06:14

## 2025-03-19 RX ADMIN — POLYETHYLENE GLYCOL 3350 1 PACKET: 17 POWDER, FOR SOLUTION ORAL at 06:14

## 2025-03-19 ASSESSMENT — COGNITIVE AND FUNCTIONAL STATUS - GENERAL
DRESSING REGULAR LOWER BODY CLOTHING: A LITTLE
SUGGESTED CMS G CODE MODIFIER MOBILITY: CI
MOVING FROM LYING ON BACK TO SITTING ON SIDE OF FLAT BED: A LITTLE
DAILY ACTIVITIY SCORE: 23
SUGGESTED CMS G CODE MODIFIER DAILY ACTIVITY: CI
MOBILITY SCORE: 23

## 2025-03-19 ASSESSMENT — PAIN DESCRIPTION - PAIN TYPE
TYPE: ACUTE PAIN
TYPE: ACUTE PAIN;SURGICAL PAIN

## 2025-03-19 NOTE — PROGRESS NOTES
Assumed care of patient at 0645. Bedside report received.   Assessment complete.  AA&Ox4. Denies CP/SOB.  Reporting 4/10 pain. Declined intervention at this time.  Educated patient regarding pharmacologic and non pharmacologic modalities for pain management.  Skin per flowsheets  Tolerating Full Liquid diet. Denies N/V.  + void. Last BM 3/16  Pt ambulates Independently.  All needs met at this time. Call light within reach. Pt calls appropriately. Bed low and locked, non skid socks in place. Hourly rounding in place.

## 2025-03-19 NOTE — DISCHARGE SUMMARY
DISCHARGE  SUMMARY    DATE OF ADMISSION: 3/16/2025    DATE OF DISCHARGE: 3/19/2025     DISCHARGE DIAGNOSIS:  Principal Problem:    Cholecystitis  Active Problems:    Bacteremia    Obesity (BMI 30-39.9)    Spinal stenosis of lumbar region with neurogenic claudication    Essential hypertension  Resolved Problems:    * No resolved hospital problems. *      CONSULTATIONS:  None     PROCEDURES:  Laparoscopic cholecystectomy with intraoperative cholangiogram by Ismael Rodriguez MD, on 3/18/2025.     BRIEF HPI and HOSPITAL COURSE:  The patient is a 69 male whop presented to the emergency department with a several hour history of severe right upper quadrant abdominal pain. Ultrasound demonstrated gallstones and gallbladder wall thickening to 5 mm. He was admitted to the [facility] and taken to the operating room the following day for procedure as above. Blood cultures were positive for E. Coli and he was treated with appropriate IV antibiotics.   On day of discharge, his leukocytosis continues to improve, T bili has normalized, his pain is controlled with tylenol, and he is tolerating a regular diet.     DISPOSITION:   Discharged home on 3/19/2025. The patient and family were counseled and questions were answered. Specifically, signs and symptoms of infection, respiratory decompensation, and persistent or worsening pain were discussed and the patient agrees to seek medical attention if any of these develop.    DISCHARGE MEDICATIONS:  The patients controlled substance history was reviewed and a controlled substance use informed consent (if applicable) was provided by [facility] and the patient has been prescribed.     Medication List        PAUSE taking these medications        Instructions   meloxicam 7.5 MG Tabs  Wait to take this until: March 31, 2025  Commonly known as: Mobic   TAKE ONE TABLET BY MOUTH ONE TIME DAILY  Dose: 7.5 mg            START taking these medications        Instructions   celecoxib 200  MG Caps  Commonly known as: CeleBREX   Take 1 Capsule by mouth 2 times a day for 7 days.  Dose: 200 mg            CONTINUE taking these medications        Instructions   lisinopril-hydrochlorothiazide 10-12.5 MG per tablet  Commonly known as: Prinzide   TAKE ONE TABLET BY MOUTH ONE TIME DAILY     sildenafil citrate 100 MG tablet  Commonly known as: Viagra   TAKE ONE TABLET BY MOUTH ONE TIME DAILY AS NEEDED FOR ERECTILE DYSFUNCTION     tamsulosin 0.4 MG capsule  Commonly known as: Flomax   Take 1 Capsule by mouth every day for 360 days.  Dose: 0.4 mg     TYLENOL PO   Take 2 Tablets by mouth one time as needed (pain).  Dose: 2 Tablet            You will be given a prescription for pain medication at discharge. Please take these as directed. It is important to remember not to take medications on an empty stomach as this may cause nausea.  You may also take over the counter acetaminophen and/or NSAIDS (ibuprofen, Aleve, Advil, Motrin) per the package instructions.  You may also use ice to the wound to decrease pain and swelling. You may alternate 20 minutes on and 20 minutes off with the ice for the first 24-48 hours. Make sure you place a washcloth or towel between the ice pack and your skin.  Please note that narcotic pain medication cannot be refilled unless you are seen by a doctor. Make sure you call the office if you are running low on medication or if the dose you have been prescribed is not working well for you.    ACTIVITY:  After discharge from the hospital, you may resume full routine activities; however, there should be no heavy lifting (greater than 20 pounds or a bag of groceries) and no strenuous activities for at least 2 weeks. The duration may be longer, depending on your surgical procedure. Routine activities of daily living are acceptable. Activity level should be addressed at your post-op follow up appointment. You may drive whenever you are off pain medications and are able to perform the activities  needed to drive, i.e., turning, bending, twisting, etc.      WOUND CARE:  You may shower, but do not submerge in a bath for at least two weeks. If you have wound dressings, they may come off after 48 hours. If you have skin glue to the wound, this will fall off on its own, do not pick at it. If you have steri strips to the wound, these will fall off on their own, do not pick at them, may trim the edges if needed.      DIET:  Upon discharge from the hospital, you may resume your normal preoperative diet, unless specifically directed otherwise. Depending on how you are feeling and whether you have nausea or not, you may wish to stay with a bland diet for the first few days. However, you can advance this as quickly as you feel ready.      FOLLOW UP:  [facility] [address] [place] [id number]  [phone]  Schedule an appointment as soon as possible for a visit in 2 week(s)  For routine post operative follow up and wound check    Call the office if you have: (1) Fevers to more than 101F, (2) Unusual chest or leg pain, (3) Drainage or fluid from incision that may be foul smelling, increased tenderness or soreness at the wound or the wound edges are no longer together, redness or swelling at the incision site. Do not hesitate to call with any other questions.    TIME SPENT ON DISCHARGE: 31 minutes      ____________________________________________  Renuka Mcfadden P.A.-C.    DD: 3/19/2025 7:29 AM

## 2025-03-19 NOTE — CARE PLAN
The patient is Stable - Low risk of patient condition declining or worsening    Problem: Pain - Standard  Goal: Alleviation of pain or a reduction in pain to the patient’s comfort goal  Outcome: Progressing     Problem: Knowledge Deficit - Standard  Goal: Patient and family/care givers will demonstrate understanding of plan of care, disease process/condition, diagnostic tests and medications  Outcome: Progressing     Problem: Fall Risk  Goal: Patient will remain free from falls  Outcome: Progressing     Shift Goals  Clinical Goals: pain control, diet tolerance, rest, comfort  Patient Goals: pain control, rest, comfort    Progress made toward(s) clinical / shift goals:  Patient's pain managed per MAR. Patient tolerating full liquid diet, but lacking appetite. Patient passing flatus, pending BM. Patient able to rest during this shift.      Patient is not progressing towards the following goals:

## 2025-03-19 NOTE — CARE PLAN
Problem: Pain - Standard  Goal: Alleviation of pain or a reduction in pain to the patient’s comfort goal  Outcome: Progressing     Problem: Knowledge Deficit - Standard  Goal: Patient and family/care givers will demonstrate understanding of plan of care, disease process/condition, diagnostic tests and medications  Outcome: Progressing     Problem: Fall Risk  Goal: Patient will remain free from falls  Outcome: Progressing     Problem: Mobility  Goal: Patient's capacity to carry out activities will improve  Outcome: Progressing   The patient is Stable - Low risk of patient condition declining or worsening    Shift Goals  Clinical Goals: Discharge  Patient Goals: Discharge  Family Goals: N/A    Progress made toward(s) clinical / shift goals:  Patient denies pain at this time, Educated patient on plan of care this shift, Free from falls throughout hospital stay    Patient is not progressing towards the following goals:

## 2025-03-19 NOTE — PROGRESS NOTES
Bedside report received.  Assessment complete.    A&O x 4. Patient calls appropriately.  Patient ambulates with standby to no assist. Bed alarm off.   Patient has 5/10 pain. Patient medicated per MAR.  Denies N&V. Tolerating full liquid diet.  X5 lap sites to abdomen, dermabond, JEANCARLOS.  + void, + flatus, - BM, last BM 3/16 PTA.  Patient denies SOB.  SCD's refused, patient ambulatory.    Review plan with of care with patient. Call light and personal belongings within reach. Hourly rounding in place. All needs met at this time.

## 2025-03-19 NOTE — DISCHARGE INSTRUCTIONS
Post Operative Discharge Instructions:    1. DIET: Upon discharge from the hospital, you may resume your normal preoperative diet, unless specifically directed otherwise. Depending on how you are feeling and whether you have nausea or not, you may wish to stay with a bland diet for the first few days. However, you can advance this as quickly as you feel ready.    2. ACTIVITIES: After discharge from the hospital, you may resume full routine activities; however, there should be no heavy lifting (greater than 20 pounds or a bag of groceries) and no strenuous activities for at least 2 weeks. The duration may be longer, depending on your surgical procedure. Routine activities of daily living are acceptable. Activity level should be addressed at your post-op follow up appointment.    3. DRIVING: You may drive whenever you are off pain medications and are able to perform the activities needed to drive, i.e., turning, bending, twisting, etc.    4. BATHING: You may get the wound wet at any time after leaving the hospital. You may shower, but do not submerge in a bath for at least two weeks.  If you have wound dressings, they may come off after 48 hours.  If you have skin glue to the wound, this will fall off on its own, do not pick at it.  If you have Steri-Strips to the wound, these will fall off on their own, do not pick at them. You may trim the edges if needed.    5. BOWEL FUNCTION:   After surgery, it is not uncommon for patients to develop either frequent or loose stools after meals. This usually corrects itself after a few days, to a few weeks. If this occurs, do not worry; this will resolve on its own.  Constipation is much more common than loose stools. The cause is the combination of pain medication and decreased activity level and possibly the nature of the surgical procedure performed. If you feel this is occurring, you may use an over-the-counter treatment such as MiraLAX (or Milk of Magnesia, Ex-Lax,  Senokot, etc.) until the problem has resolved. Drink plenty of water and try to wean off narcotic pain medications as soon as is comfortable for you.    6. PAIN MEDICATION:   You will be given a prescription for pain medication at discharge. Please take these as directed. It is important to remember not to take medications on an empty stomach as this may cause nausea.  You may also take over the counter acetaminophen and/or NSAIDS (ibuprofen, Aleve, Advil, Motrin) per the package instructions.  You may also use ice to the wound to decrease pain and swelling. You may alternate 20 minutes on and 20 minutes off with the ice for the first 24-48 hours. Make sure you place a washcloth or towel between the ice pack and your skin.  Please note that narcotic pain medication cannot be refilled unless you are seen by a doctor. Make sure you call the office if you are running low on medication or if the dose you have been prescribed is not working well for you.    7.CALL THE [facility] [phone]  (1) Fevers to more than 101F, (2) Unusual chest or leg pain, (3) Drainage or fluid from incision that may be foul smelling, increased tenderness or soreness at the wound or the wound edges are no longer together, redness or swelling at the incision site. Do not hesitate to call with any other questions.

## 2025-03-19 NOTE — PROGRESS NOTES
Discharge orders received.  Patient arrived to the discharge lounge.  PIV removed.  Instructions given, medications reviewed and general discharge education provided to patient.  Follow up appointments discussed.  Patient verbalized understanding of dc instructions and prescriptions.  Patient signed discharge instructions.  Patient verbalized understanding had all belongings with him.  Patient left with meds and family. Wished patient a speedy recovery.

## 2025-03-23 LAB
BACTERIA BLD CULT: NORMAL
BACTERIA BLD CULT: NORMAL
SIGNIFICANT IND 70042: NORMAL
SIGNIFICANT IND 70042: NORMAL
SITE SITE: NORMAL
SITE SITE: NORMAL
SOURCE SOURCE: NORMAL
SOURCE SOURCE: NORMAL

## 2025-03-31 PROBLEM — R78.81 BACTEREMIA: Status: RESOLVED | Noted: 2025-03-18 | Resolved: 2025-03-31

## 2025-03-31 PROBLEM — K81.9 CHOLECYSTITIS: Status: RESOLVED | Noted: 2025-03-16 | Resolved: 2025-03-31

## (undated) DEVICE — TROCAR Z THREAD11MM OPTICAL - NON BLADED(6/BX)

## (undated) DEVICE — DRAPE 36X28IN RAD CARM BND BG - (25/CA)

## (undated) DEVICE — SODIUM CHL IRRIGATION 0.9% 1000ML (12EA/CA)

## (undated) DEVICE — BAG RETRIEVAL 10ML (10EA/BX)

## (undated) DEVICE — GLOVE BIOGEL SZ 6.5 SURGICAL PF LTX (50PR/BX 4BX/CA)

## (undated) DEVICE — GLOVE BIOGEL INDICATOR SZ 7.5 SURGICAL PF LTX - (50PR/BX 4BX/CA)

## (undated) DEVICE — SUTURE GENERAL

## (undated) DEVICE — SET SUCTION/IRRIGATION WITH DISPOSABLE TIP (6/CA )PART #0250-070-520 IS A SUB

## (undated) DEVICE — GLOVE BIOGEL INDICATOR SZ 6.5 SURGICAL PF LTX - (50PR/BX 4BX/CA)

## (undated) DEVICE — CLIP MED LG INTNL HRZN TI ESCP - (20/BX)

## (undated) DEVICE — TROCAR 5X100 BLADED Z-THREAD - KII (6/BX)

## (undated) DEVICE — SET LEADWIRE 5 LEAD BEDSIDE DISPOSABLE ECG (1SET OF 5/EA)

## (undated) DEVICE — ELECTRODE DUAL RETURN W/ CORD - (50/PK)

## (undated) DEVICE — CATHETER REDDICK ----MUST ORDER IN MULITPLES OF 10----

## (undated) DEVICE — SUTURE 0 VICRYL PLUS UR-6 - 27 INCH (36/BX)

## (undated) DEVICE — GLOVE BIOGEL SZ 7.5 SURGICAL PF LTX - (50PR/BX 4BX/CA)

## (undated) DEVICE — GLOVE BIOGEL PI INDICATOR SZ 7.0 SURGICAL PF LF - (50/BX 4BX/CA)

## (undated) DEVICE — VESSEL DIVIDER SEAL LAP LIGASURE 37CM (6EA/BX)

## (undated) DEVICE — CANISTER SUCTION 3000ML MECHANICAL FILTER AUTO SHUTOFF MEDI-VAC NONSTERILE LF DISP (40EA/CA)

## (undated) DEVICE — CONTAINER SPECIMEN BAG OR - STERILE 4 OZ W/LID (100EA/CA)

## (undated) DEVICE — CANNULA W/SEAL 5X100 Z-THRE - ADED KII (12/BX)

## (undated) DEVICE — COVER LIGHT HANDLE ALC PLUS DISP (18EA/BX)

## (undated) DEVICE — SUTURE 4-0 VICRYL PLUS FS-2 - 27 INCH (36/BX)

## (undated) DEVICE — GOWN SURGEONS X-LARGE - DISP. (30/CA)

## (undated) DEVICE — DERMABOND ADVANCED - (12EA/BX)

## (undated) DEVICE — SYRINGE STERILE 10 ML LL (200/BX)

## (undated) DEVICE — TUBING CLEARLINK DUO-VENT - C-FLO (48EA/CA)

## (undated) DEVICE — SENSOR OXIMETER ADULT SPO2 RD SET (20EA/BX)

## (undated) DEVICE — CHLORAPREP 26 ML APPLICATOR - ORANGE TINT(25/CA)

## (undated) DEVICE — GLOVE SZ 6.5 BIOGEL PI MICRO - PF LF (50PR/BX)

## (undated) DEVICE — PACK LAP CHOLE OR - (2EA/CA)

## (undated) DEVICE — NEEDLE INSFL 120MM 14GA VRRS - (20/BX)

## (undated) DEVICE — SYRINGE 30 ML LL (56/BX)

## (undated) DEVICE — GOWN WARMING STANDARD FLEX - (30/CA)

## (undated) DEVICE — SET EXTENSION WITH 2 PORTS (48EA/CA) ***PART #2C8610 IS A SUBSTITUTE*****

## (undated) DEVICE — GLOVE BIOGEL PI ORTHO SZ 6 SURGICAL PF LF (40PR/BX)

## (undated) DEVICE — LACTATED RINGERS INJ 1000 ML - (14EA/CA 60CA/PF)